# Patient Record
Sex: FEMALE | Race: WHITE | NOT HISPANIC OR LATINO | Employment: FULL TIME | URBAN - METROPOLITAN AREA
[De-identification: names, ages, dates, MRNs, and addresses within clinical notes are randomized per-mention and may not be internally consistent; named-entity substitution may affect disease eponyms.]

---

## 2017-01-30 DIAGNOSIS — J30.9 ALLERGIC RHINITIS: ICD-10-CM

## 2017-01-30 RX ORDER — MONTELUKAST SODIUM 10 MG/1
TABLET ORAL
Qty: 30 TABLET | Refills: 0 | Status: SHIPPED | OUTPATIENT
Start: 2017-01-30 | End: 2017-02-27 | Stop reason: SDUPTHER

## 2017-02-27 DIAGNOSIS — J30.9 ALLERGIC RHINITIS: ICD-10-CM

## 2017-02-27 RX ORDER — MONTELUKAST SODIUM 10 MG/1
TABLET ORAL
Qty: 30 TABLET | Refills: 1 | Status: SHIPPED | OUTPATIENT
Start: 2017-02-27 | End: 2017-04-28 | Stop reason: SDUPTHER

## 2017-04-28 DIAGNOSIS — J30.9 ALLERGIC RHINITIS: ICD-10-CM

## 2017-04-28 RX ORDER — MONTELUKAST SODIUM 10 MG/1
TABLET ORAL
Qty: 30 TABLET | Refills: 0 | Status: SHIPPED | OUTPATIENT
Start: 2017-04-28 | End: 2017-05-26 | Stop reason: SDUPTHER

## 2017-04-29 DIAGNOSIS — J30.9 ALLERGIC RHINITIS: ICD-10-CM

## 2017-05-01 RX ORDER — MONTELUKAST SODIUM 10 MG/1
TABLET ORAL
Qty: 30 TABLET | Refills: 0 | OUTPATIENT
Start: 2017-05-01

## 2017-05-01 RX ORDER — GUANFACINE 2 MG/1
TABLET ORAL
Qty: 60 TABLET | Refills: 0 | OUTPATIENT
Start: 2017-05-01

## 2017-05-26 DIAGNOSIS — J30.9 ALLERGIC RHINITIS: ICD-10-CM

## 2017-05-26 RX ORDER — MONTELUKAST SODIUM 10 MG/1
TABLET ORAL
Qty: 30 TABLET | Refills: 3 | Status: SHIPPED | OUTPATIENT
Start: 2017-05-26 | End: 2017-09-27 | Stop reason: SDUPTHER

## 2017-06-19 RX ORDER — GUANFACINE 2 MG/1
TABLET ORAL
Qty: 60 TABLET | Refills: 0 | Status: SHIPPED | OUTPATIENT
Start: 2017-06-19 | End: 2017-08-18 | Stop reason: SDUPTHER

## 2017-08-18 RX ORDER — GUANFACINE 2 MG/1
TABLET ORAL
Qty: 60 TABLET | Refills: 2 | Status: SHIPPED | OUTPATIENT
Start: 2017-08-18 | End: 2018-01-05 | Stop reason: SDUPTHER

## 2017-09-20 ENCOUNTER — OFFICE VISIT (OUTPATIENT)
Dept: INTERNAL MEDICINE | Facility: CLINIC | Age: 47
End: 2017-09-20

## 2017-09-20 VITALS
DIASTOLIC BLOOD PRESSURE: 78 MMHG | SYSTOLIC BLOOD PRESSURE: 110 MMHG | BODY MASS INDEX: 29 KG/M2 | TEMPERATURE: 97.8 F | RESPIRATION RATE: 17 BRPM | HEIGHT: 62 IN | WEIGHT: 157.6 LBS | OXYGEN SATURATION: 98 % | HEART RATE: 62 BPM

## 2017-09-20 DIAGNOSIS — M19.90 ARTHRITIS: ICD-10-CM

## 2017-09-20 DIAGNOSIS — Z76.89 SLEEP CONCERN: ICD-10-CM

## 2017-09-20 DIAGNOSIS — Z00.00 ANNUAL PHYSICAL EXAM: Primary | ICD-10-CM

## 2017-09-20 LAB
ALBUMIN SERPL-MCNC: 4.5 G/DL (ref 3.5–5.2)
ALBUMIN/GLOB SERPL: 1.6 G/DL
ALP SERPL-CCNC: 38 U/L (ref 39–117)
ALT SERPL W P-5'-P-CCNC: 13 U/L (ref 1–33)
ANION GAP SERPL CALCULATED.3IONS-SCNC: 12 MMOL/L
AST SERPL-CCNC: 16 U/L (ref 1–32)
BASOPHILS # BLD AUTO: 0.03 10*3/MM3 (ref 0–0.2)
BASOPHILS NFR BLD AUTO: 0.5 % (ref 0–2)
BILIRUB SERPL-MCNC: 0.8 MG/DL (ref 0.1–1.2)
BILIRUB UR QL STRIP: NEGATIVE
BUN BLD-MCNC: 8 MG/DL (ref 6–20)
BUN/CREAT SERPL: 9 (ref 7–25)
CALCIUM SPEC-SCNC: 9.9 MG/DL (ref 8.6–10.5)
CHLORIDE SERPL-SCNC: 100 MMOL/L (ref 98–107)
CHOLEST SERPL-MCNC: 191 MG/DL (ref 0–200)
CHROMATIN AB SERPL-ACNC: <10 IU/ML (ref 0–14)
CLARITY UR: CLEAR
CO2 SERPL-SCNC: 28 MMOL/L (ref 22–29)
COLOR UR: YELLOW
CREAT BLD-MCNC: 0.89 MG/DL (ref 0.57–1)
DEPRECATED RDW RBC AUTO: 40.7 FL (ref 37–54)
EOSINOPHIL # BLD AUTO: 0.07 10*3/MM3 (ref 0–0.7)
EOSINOPHIL NFR BLD AUTO: 1.3 % (ref 0–5)
ERYTHROCYTE [DISTWIDTH] IN BLOOD BY AUTOMATED COUNT: 11.8 % (ref 11.5–15)
ERYTHROCYTE [SEDIMENTATION RATE] IN BLOOD: 5 MM/HR (ref 0–20)
GFR SERPL CREATININE-BSD FRML MDRD: 69 ML/MIN/1.73
GLOBULIN UR ELPH-MCNC: 2.8 GM/DL
GLUCOSE BLD-MCNC: 108 MG/DL (ref 65–99)
GLUCOSE UR STRIP-MCNC: NEGATIVE MG/DL
HCT VFR BLD AUTO: 38.7 % (ref 34.1–44.9)
HDLC SERPL-MCNC: 68 MG/DL (ref 40–60)
HGB BLD-MCNC: 13.8 G/DL (ref 11.2–15.7)
HGB UR QL STRIP.AUTO: NEGATIVE
KETONES UR QL STRIP: NEGATIVE
LDLC SERPL CALC-MCNC: 109 MG/DL (ref 0–100)
LDLC/HDLC SERPL: 1.6 {RATIO}
LEUKOCYTE ESTERASE UR QL STRIP.AUTO: NEGATIVE
LYMPHOCYTES # BLD AUTO: 2.22 10*3/MM3 (ref 0.8–7)
LYMPHOCYTES NFR BLD AUTO: 39.9 % (ref 10–60)
MCH RBC QN AUTO: 34.7 PG (ref 26–34)
MCHC RBC AUTO-ENTMCNC: 35.7 G/DL (ref 31–37)
MCV RBC AUTO: 97.2 FL (ref 80–100)
MONOCYTES # BLD AUTO: 0.31 10*3/MM3 (ref 0–1)
MONOCYTES NFR BLD AUTO: 5.6 % (ref 0–13)
NEUTROPHILS # BLD AUTO: 2.93 10*3/MM3 (ref 1–11)
NEUTROPHILS NFR BLD AUTO: 52.7 % (ref 30–85)
NITRITE UR QL STRIP: NEGATIVE
PH UR STRIP.AUTO: 7 [PH] (ref 5–8)
PLATELET # BLD AUTO: 211 10*3/MM3 (ref 150–450)
PMV BLD AUTO: 11 FL (ref 6–12)
POTASSIUM BLD-SCNC: 4.2 MMOL/L (ref 3.5–5.2)
PROT SERPL-MCNC: 7.3 G/DL (ref 6–8.5)
PROT UR QL STRIP: NEGATIVE
RBC # BLD AUTO: 3.98 10*6/MM3 (ref 3.93–5.22)
SODIUM BLD-SCNC: 140 MMOL/L (ref 136–145)
SP GR UR STRIP: 1.01 (ref 1–1.03)
TRIGL SERPL-MCNC: 72 MG/DL (ref 0–150)
UROBILINOGEN UR QL STRIP: NORMAL
VLDLC SERPL-MCNC: 14.4 MG/DL (ref 5–40)
WBC NRBC COR # BLD: 5.56 10*3/MM3 (ref 5–10)

## 2017-09-20 PROCEDURE — 99396 PREV VISIT EST AGE 40-64: CPT | Performed by: INTERNAL MEDICINE

## 2017-09-20 PROCEDURE — 86431 RHEUMATOID FACTOR QUANT: CPT | Performed by: INTERNAL MEDICINE

## 2017-09-20 PROCEDURE — 85025 COMPLETE CBC W/AUTO DIFF WBC: CPT | Performed by: INTERNAL MEDICINE

## 2017-09-20 PROCEDURE — 80061 LIPID PANEL: CPT | Performed by: INTERNAL MEDICINE

## 2017-09-20 PROCEDURE — 80053 COMPREHEN METABOLIC PANEL: CPT | Performed by: INTERNAL MEDICINE

## 2017-09-20 PROCEDURE — 81003 URINALYSIS AUTO W/O SCOPE: CPT | Performed by: INTERNAL MEDICINE

## 2017-09-20 PROCEDURE — 85652 RBC SED RATE AUTOMATED: CPT | Performed by: INTERNAL MEDICINE

## 2017-09-20 PROCEDURE — 36415 COLL VENOUS BLD VENIPUNCTURE: CPT | Performed by: INTERNAL MEDICINE

## 2017-09-20 RX ORDER — PAROXETINE HYDROCHLORIDE 20 MG/1
1 TABLET, FILM COATED ORAL DAILY
COMMUNITY
Start: 2017-08-24 | End: 2017-11-21 | Stop reason: ALTCHOICE

## 2017-09-20 RX ORDER — ESTRADIOL 0.1 MG/G
2 CREAM VAGINAL DAILY
COMMUNITY
End: 2022-04-26

## 2017-09-20 NOTE — PROGRESS NOTES
Chief Complaint   Patient presents with   • Annual Exam        Subjective   Viktoria Mukherjee is a 46 y.o. female     HPI: She is here for her annual examination.  She has ongoing problems with arthritis, hypothyroidism, allergies, tremor, numbness in hands and feet and fatigue.    Arthritis affects her hips, toes, hands, feet and knees.  It doesn't happen all the time.  She notices it after she's been seated for a while.  Also with weather changes.  Movement and ibuprofen help.  She takes ibuprofen as needed.  200-400 mg a time, once or twice a week.  When she was in college, she was evaluated for rheumatoid arthritis.  She is wondering if that's going on.    She sees an endocrinologist for follow-up of hypothyroidism.  She currently takes levothyroxine 88 µg 1 tablet 6 days a week and takes one and a half tablets on Sundays. Allergies are controlled with montelukast and an inhaler. Tremor is controlled with guanfacine. She notices numbness in her hands during the night.  Notices numbness of her feet while exercising. She has upper and lower back ache at times.     She has had follow up with psychiatry recently for anxiety and depression. She has started taking paroxetine.  It helps, but she has gained weight.    She sees her gyn for pap and mammograms.  She is up to date on these.  She has regular dental and eye exams.  She had a tetanus booster in 4/2011.    She has problems with fatigue. Does not always feel rested when she gets up in the mornings.  Can fall asleep during the day.    The following portions of the patient's history were reviewed and updated as appropriate: allergies, current medications, past social history, problem list, past surgical history    Review of Systems   Constitutional: Positive for fatigue. Negative for appetite change, chills and fever.   HENT: Negative for congestion, ear pain, sinus pressure, sneezing, sore throat, tinnitus, trouble swallowing and voice change.    Eyes: Negative for  "visual disturbance.        Glasses   Respiratory: Negative for cough and shortness of breath.    Cardiovascular: Negative for chest pain, palpitations and leg swelling.   Gastrointestinal: Positive for constipation. Negative for abdominal pain, diarrhea, nausea and vomiting.   Endocrine: Negative for cold intolerance, heat intolerance, polydipsia and polyuria.   Genitourinary: Negative for dysuria and frequency.        Stress incontinence   Musculoskeletal: Positive for arthralgias, back pain and myalgias. Negative for gait problem.   Skin: Negative for rash.   Neurological: Positive for tremors and numbness. Negative for dizziness, syncope and headaches.   Hematological: Negative for adenopathy. Does not bruise/bleed easily.   Psychiatric/Behavioral: Negative for decreased concentration, dysphoric mood and sleep disturbance. The patient is not nervous/anxious.            Objective     /78 (BP Location: Left arm, Patient Position: Sitting, Cuff Size: Adult)  Pulse 62  Temp 97.8 °F (36.6 °C) (Temporal Artery )   Resp 17  Ht 62.25\" (158.1 cm)  Wt 157 lb 9.6 oz (71.5 kg)  SpO2 98%  Breastfeeding? No  BMI 28.59 kg/m2     Physical Exam   Constitutional: She is oriented to person, place, and time. She appears well-developed and well-nourished. No distress.   HENT:   Right Ear: Hearing, tympanic membrane, external ear and ear canal normal.   Left Ear: Hearing, tympanic membrane, external ear and ear canal normal.   Nose: Right sinus exhibits no maxillary sinus tenderness and no frontal sinus tenderness. Left sinus exhibits no maxillary sinus tenderness and no frontal sinus tenderness.   Eyes: Conjunctivae, EOM and lids are normal. Pupils are equal, round, and reactive to light.   Neck: Trachea normal and phonation normal. Neck supple. Normal carotid pulses and no JVD present. Carotid bruit is not present. No thyroid mass and no thyromegaly present.   Cardiovascular: Normal rate, regular rhythm, S1 normal and " S2 normal.  PMI is not displaced.  Exam reveals no gallop and no friction rub.    No murmur heard.  Pulses:       Carotid pulses are 2+ on the right side, and 2+ on the left side.       Radial pulses are 2+ on the right side, and 2+ on the left side.        Dorsalis pedis pulses are 2+ on the right side, and 2+ on the left side.   Pulmonary/Chest: Effort normal and breath sounds normal. She has no wheezes. She has no rhonchi. She has no rales. Chest wall is not dull to percussion.   Abdominal: Soft. Normal appearance, normal aorta and bowel sounds are normal. She exhibits no abdominal bruit and no mass. There is no hepatosplenomegaly. There is no tenderness.   Musculoskeletal: Normal range of motion. She exhibits no edema, tenderness or deformity.   Lymphadenopathy:     She has no cervical adenopathy.     She has no axillary adenopathy.        Left: No supraclavicular adenopathy present.   Neurological: She is alert and oriented to person, place, and time. She has normal strength and normal reflexes. No cranial nerve deficit or sensory deficit. Coordination normal.   Skin: Skin is warm and dry. No rash noted.   Psychiatric: She has a normal mood and affect. Her speech is normal and behavior is normal. Judgment and thought content normal. Cognition and memory are normal. She is attentive.   Nursing note and vitals reviewed.      Assessment/Plan   Problem List Items Addressed This Visit     None      Visit Diagnoses     Annual physical exam    -  Primary    Relevant Orders    Comprehensive Metabolic Panel    Urinalysis With / Microscopic If Indicated (Completed)    Lipid Panel    CBC & Differential (Completed)    CBC Auto Differential (Completed)    Sleep concern        Relevant Orders    Ambulatory Referral to Sleep Medicine    Arthritis        Relevant Orders    Rheumatoid Factor, Quant    Sedimentation Rate      Advised OK to take advil 400 mgm tid.  She will continue prudent diet, regular exercise.

## 2017-09-27 DIAGNOSIS — J30.9 ALLERGIC RHINITIS: ICD-10-CM

## 2017-09-27 RX ORDER — MONTELUKAST SODIUM 10 MG/1
TABLET ORAL
Qty: 30 TABLET | Refills: 2 | Status: SHIPPED | OUTPATIENT
Start: 2017-09-27 | End: 2017-12-29 | Stop reason: SDUPTHER

## 2017-11-21 ENCOUNTER — OFFICE VISIT (OUTPATIENT)
Dept: SLEEP MEDICINE | Facility: HOSPITAL | Age: 47
End: 2017-11-21
Attending: INTERNAL MEDICINE

## 2017-11-21 VITALS
HEIGHT: 63 IN | SYSTOLIC BLOOD PRESSURE: 125 MMHG | DIASTOLIC BLOOD PRESSURE: 76 MMHG | BODY MASS INDEX: 28.35 KG/M2 | WEIGHT: 160 LBS | HEART RATE: 68 BPM

## 2017-11-21 DIAGNOSIS — R53.82 CHRONIC FATIGUE: ICD-10-CM

## 2017-11-21 DIAGNOSIS — F45.8 BRUXISM: ICD-10-CM

## 2017-11-21 DIAGNOSIS — Z76.89 SLEEP CONCERN: ICD-10-CM

## 2017-11-21 DIAGNOSIS — G47.10 HYPERSOMNIA: Primary | ICD-10-CM

## 2017-11-21 DIAGNOSIS — G47.33 OSA (OBSTRUCTIVE SLEEP APNEA): ICD-10-CM

## 2017-11-21 DIAGNOSIS — G47.419 PRIMARY NARCOLEPSY WITHOUT CATAPLEXY: ICD-10-CM

## 2017-11-21 PROCEDURE — G0463 HOSPITAL OUTPT CLINIC VISIT: HCPCS

## 2017-11-21 RX ORDER — DESVENLAFAXINE SUCCINATE 50 MG/1
50 TABLET, EXTENDED RELEASE ORAL DAILY
COMMUNITY
Start: 2017-11-03 | End: 2021-10-15 | Stop reason: DRUGHIGH
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
## 2017-12-29 DIAGNOSIS — J30.9 ALLERGIC RHINITIS: ICD-10-CM

## 2017-12-29 RX ORDER — MONTELUKAST SODIUM 10 MG/1
TABLET ORAL
Qty: 30 TABLET | Refills: 3 | Status: SHIPPED | OUTPATIENT
Start: 2017-12-29 | End: 2018-04-30 | Stop reason: SDUPTHER

## 2018-01-05 RX ORDER — GUANFACINE 2 MG/1
TABLET ORAL
Qty: 60 TABLET | Refills: 1 | Status: SHIPPED | OUTPATIENT
Start: 2018-01-05 | End: 2018-06-13 | Stop reason: SDUPTHER

## 2018-02-06 ENCOUNTER — APPOINTMENT (OUTPATIENT)
Dept: WOMENS IMAGING | Facility: HOSPITAL | Age: 48
End: 2018-02-06

## 2018-02-06 PROCEDURE — 77067 SCR MAMMO BI INCL CAD: CPT | Performed by: RADIOLOGY

## 2018-02-06 PROCEDURE — 77063 BREAST TOMOSYNTHESIS BI: CPT | Performed by: RADIOLOGY

## 2018-02-15 ENCOUNTER — HOSPITAL ENCOUNTER (OUTPATIENT)
Dept: SLEEP MEDICINE | Facility: HOSPITAL | Age: 48
Discharge: HOME | End: 2018-02-15
Attending: PSYCHIATRY & NEUROLOGY | Admitting: PSYCHIATRY & NEUROLOGY

## 2018-02-15 DIAGNOSIS — G47.10 HYPERSOMNIA: ICD-10-CM

## 2018-02-15 DIAGNOSIS — R53.82 CHRONIC FATIGUE: ICD-10-CM

## 2018-02-15 DIAGNOSIS — G47.33 OSA (OBSTRUCTIVE SLEEP APNEA): ICD-10-CM

## 2018-02-15 DIAGNOSIS — F45.8 BRUXISM: ICD-10-CM

## 2018-02-15 PROCEDURE — 95810 POLYSOM 6/> YRS 4/> PARAM: CPT

## 2018-02-16 ENCOUNTER — HOSPITAL ENCOUNTER (OUTPATIENT)
Dept: SLEEP MEDICINE | Facility: HOSPITAL | Age: 48
Discharge: HOME | End: 2018-02-16
Attending: PSYCHIATRY & NEUROLOGY | Admitting: PSYCHIATRY & NEUROLOGY

## 2018-02-16 DIAGNOSIS — R53.82 CHRONIC FATIGUE: ICD-10-CM

## 2018-02-16 DIAGNOSIS — G47.419 PRIMARY NARCOLEPSY WITHOUT CATAPLEXY: ICD-10-CM

## 2018-02-16 DIAGNOSIS — G47.10 HYPERSOMNIA: ICD-10-CM

## 2018-02-16 LAB
AMPHET+METHAMPHET UR QL: NEGATIVE
BARBITURATES UR QL SCN: NEGATIVE
BENZODIAZ UR QL SCN: NEGATIVE
CANNABINOIDS SERPL QL: NEGATIVE
COCAINE UR QL: NEGATIVE
METHADONE UR QL SCN: NEGATIVE
OPIATES UR QL: NEGATIVE
OXYCODONE UR QL SCN: NEGATIVE

## 2018-02-16 PROCEDURE — 80307 DRUG TEST PRSMV CHEM ANLYZR: CPT | Performed by: PSYCHIATRY & NEUROLOGY

## 2018-02-16 PROCEDURE — 95805 MULTIPLE SLEEP LATENCY TEST: CPT

## 2018-03-19 ENCOUNTER — OFFICE VISIT (OUTPATIENT)
Dept: INTERNAL MEDICINE | Facility: CLINIC | Age: 48
End: 2018-03-19

## 2018-03-19 VITALS
HEIGHT: 63 IN | WEIGHT: 153 LBS | SYSTOLIC BLOOD PRESSURE: 108 MMHG | DIASTOLIC BLOOD PRESSURE: 70 MMHG | BODY MASS INDEX: 27.11 KG/M2

## 2018-03-19 DIAGNOSIS — Z76.89 SLEEP CONCERN: Primary | ICD-10-CM

## 2018-03-19 PROCEDURE — 99212 OFFICE O/P EST SF 10 MIN: CPT | Performed by: INTERNAL MEDICINE

## 2018-03-19 RX ORDER — CHLORAL HYDRATE 500 MG
CAPSULE ORAL
COMMUNITY

## 2018-03-19 NOTE — PROGRESS NOTES
Chief Complaint   Patient presents with   • Follow-up     6 month follow up   • Hypothyroidism       Subjective   Viktoria Mukherjee is a 47 y.o. female.     She comes in to follow-up on her evaluation of sleep concern.  She continues on her medications as listed.  She has had consultation with the sleep Center, she has had a sleep study.  She does not have obstructive sleep apnea.  She has changed her diet.  She's eating less sugar.  She's having less fatigue.  She continues to see her endocrinologist for hypothyroidism, sees her neurologist for the tic disorder.         The following portions of the patient's history were reviewed and updated as appropriate: allergies, current medications, past family history, past medical history, past social history, past surgical history and problem list.      Current Outpatient Prescriptions:   •  cholecalciferol (VITAMIN D3) 1000 UNITS tablet, Take 1,000 Units by mouth daily., Disp: , Rfl:   •  desvenlafaxine (PRISTIQ) 50 MG 24 hr tablet, Take 50 mg by mouth Daily., Disp: , Rfl:   •  DULERA 100-5 MCG/ACT inhaler, INHALE TWO PUFFS BY MOUTH TWICE A DAY, Disp: 13 g, Rfl: 2  •  estradiol (ESTRACE) 0.1 MG/GM vaginal cream, Insert 2 g into the vagina Daily., Disp: , Rfl:   •  guanFACINE (TENEX) 2 MG tablet, TAKE ONE TABLET BY MOUTH TWICE A DAY, Disp: 60 tablet, Rfl: 1  •  levothyroxine (SYNTHROID, LEVOTHROID) 88 MCG tablet, Take 88 mcg by mouth Daily., Disp: , Rfl:   •  magnesium oxide (MAGOX) 400 (241.3 Mg) MG tablet tablet, Take 400 mg by mouth Daily., Disp: , Rfl:   •  montelukast (SINGULAIR) 10 MG tablet, TAKE ONE TABLET BY MOUTH DAILY, Disp: 30 tablet, Rfl: 3  •  Multiple Vitamin (MULTI VITAMIN DAILY) tablet, Take  by mouth., Disp: , Rfl:   •  Omega-3 Fatty Acids (FISH OIL) 1000 MG capsule capsule, Take  by mouth Daily With Breakfast., Disp: , Rfl:     Review of Systems   Constitutional: Negative for appetite change.   HENT: Negative for nosebleeds.    Eyes: Negative for blurred  "vision and double vision.   Respiratory: Negative for cough and shortness of breath.    Cardiovascular: Negative for chest pain, palpitations and leg swelling.   Neurological: Negative for dizziness and headaches.       Objective     Vitals:    03/19/18 0816   BP: 108/70   Weight: 69.4 kg (153 lb)   Height: 160 cm (63\")       Physical Exam   Constitutional: She is oriented to person, place, and time. She appears well-developed and well-nourished. No distress.   Neck: Normal carotid pulses present. Carotid bruit is not present.   Cardiovascular: Regular rhythm, S1 normal and S2 normal.  Exam reveals no gallop and no friction rub.    No murmur heard.  Pulses:       Carotid pulses are 2+ on the right side, and 2+ on the left side.  Pulmonary/Chest: Effort normal and breath sounds normal. She has no wheezes. She has no rhonchi. She has no rales. Chest wall is not dull to percussion.   Musculoskeletal: She exhibits no edema.   Neurological: She is alert and oriented to person, place, and time.   Skin: Skin is warm and dry.         Assessment/Plan   Viktoria was seen today for follow-up and hypothyroidism.    Diagnoses and all orders for this visit:    Sleep concern      She will follow-up with the sleep Center to discuss options for treatment of daytime somnolence that she has had..  It sounds like it is better with her change in diet.  Return in 6 months for CPE, fasting lab.         "

## 2018-03-20 ENCOUNTER — OFFICE VISIT (OUTPATIENT)
Dept: SLEEP MEDICINE | Facility: HOSPITAL | Age: 48
End: 2018-03-20
Attending: PSYCHIATRY & NEUROLOGY

## 2018-03-20 VITALS
SYSTOLIC BLOOD PRESSURE: 113 MMHG | HEIGHT: 60 IN | WEIGHT: 153 LBS | BODY MASS INDEX: 30.04 KG/M2 | DIASTOLIC BLOOD PRESSURE: 71 MMHG | HEART RATE: 61 BPM

## 2018-03-20 DIAGNOSIS — G47.11 IDIOPATHIC HYPERSOMNIA WITH LONG SLEEP TIME: Primary | ICD-10-CM

## 2018-03-20 PROCEDURE — G0463 HOSPITAL OUTPT CLINIC VISIT: HCPCS

## 2018-03-20 RX ORDER — MODAFINIL 200 MG/1
200 TABLET ORAL DAILY
Qty: 30 TABLET | Refills: 5 | Status: SHIPPED | OUTPATIENT
Start: 2018-03-20 | End: 2018-09-18 | Stop reason: SDUPTHER
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
## 2018-03-20 NOTE — PATIENT INSTRUCTIONS
Modafinil tablets  What is this medicine?  MODAFINIL (aretha DAF i nil) is used to treat excessive sleepiness caused by certain sleep disorders. This includes narcolepsy, sleep apnea, and shift work sleep disorder.  This medicine may be used for other purposes; ask your health care provider or pharmacist if you have questions.  COMMON BRAND NAME(S): Provigil  What should I tell my health care provider before I take this medicine?  They need to know if you have any of these conditions:  -history of depression, veronica, or other mental disorder  -kidney disease  -liver disease  -an unusual or allergic reaction to modafinil, other medicines, foods, dyes, or preservatives  -pregnant or trying to get pregnant  -breast-feeding  How should I use this medicine?  Take this medicine by mouth with a glass of water. Follow the directions on the prescription label. Take your doses at regular intervals. Do not take your medicine more often than directed. Do not stop taking except on your doctor's advice.  A special MedGuide will be given to you by the pharmacist with each prescription and refill. Be sure to read this information carefully each time.  Talk to your pediatrician regarding the use of this medicine in children. This medicine is not approved for use in children.  Overdosage: If you think you have taken too much of this medicine contact a poison control center or emergency room at once.  NOTE: This medicine is only for you. Do not share this medicine with others.  What if I miss a dose?  If you miss a dose, take it as soon as you can. If it is almost time for your next dose, take only that dose. Do not take double or extra doses.  What may interact with this medicine?  Do not take this medicine with any of the following medications:  -amphetamine or dextroamphetamine  -dexmethylphenidate or methylphenidate  -medicines called MAO Inhibitors like Nardil, Parnate, Marplan, Eldepryl  -pemoline  -procarbazine  This medicine may  also interact with the following medications:  -antifungal medicines like itraconazole or ketoconazole  -barbiturates like phenobarbital  -birth control pills or other hormone-containing birth control devices or implants  -carbamazepine  -cyclosporine  -diazepam  -medicines for depression, anxiety, or psychotic disturbances  -phenytoin  -propranolol  -triazolam  -warfarin  This list may not describe all possible interactions. Give your health care provider a list of all the medicines, herbs, non-prescription drugs, or dietary supplements you use. Also tell them if you smoke, drink alcohol, or use illegal drugs. Some items may interact with your medicine.  What should I watch for while using this medicine?  Visit your doctor or health care professional for regular checks on your progress. The full effects of this medicine may not be seen right away.  This medicine may affect your concentration, function, or may hide signs that you are tired. You may get dizzy. Do not drive, use machinery, or do anything that needs mental alertness until you know how this drug affects you. Alcohol can make you more dizzy and may interfere with your response to this medicine or your alertness. Avoid alcoholic drinks.  Birth control pills may not work properly while you are taking this medicine. Talk to your doctor about using an extra method of birth control.  It is unknown if the effects of this medicine will be increased by the use of caffeine. Caffeine is available in many foods, beverages, and medications. Ask your doctor if you should limit or change your intake of caffeine-containing products while on this medicine.  What side effects may I notice from receiving this medicine?  Side effects that you should report to your doctor or health care professional as soon as possible:  -allergic reactions like skin rash, itching or hives, swelling of the face, lips, or tongue  -anxiety  -breathing problems  -chest pain  -fast, irregular  heartbeat  -hallucinations  -increased blood pressure  -redness, blistering, peeling or loosening of the skin, including inside the mouth  -sore throat, fever, or chills  -suicidal thoughts or other mood changes  -tremors  -vomiting  Side effects that usually do not require medical attention (report to your doctor or health care professional if they continue or are bothersome):  -headache  -nausea, diarrhea, or stomach upset  -nervousness  -trouble sleeping  This list may not describe all possible side effects. Call your doctor for medical advice about side effects. You may report side effects to FDA at 4-108-FDA-7773.  Where should I keep my medicine?  Keep out of the reach of children. This medicine can be abused. Keep your medicine in a safe place to protect it from theft. Do not share this medicine with anyone. Selling or giving away this medicine is dangerous and against the law.  This medicine may cause accidental overdose and death if taken by other adults, children, or pets. Mix any unused medicine with a substance like cat litter or coffee grounds. Then throw the medicine away in a sealed container like a sealed bag or a coffee can with a lid. Do not use the medicine after the expiration date.  Store at room temperature between 20 and 25 degrees C (68 and 77 degrees F).  NOTE: This sheet is a summary. It may not cover all possible information. If you have questions about this medicine, talk to your doctor, pharmacist, or health care provider.  © 2018 [employer]vier/Gold Standard (2015-09-08 15:34:55)

## 2018-04-30 DIAGNOSIS — J30.9 ALLERGIC RHINITIS: ICD-10-CM

## 2018-04-30 RX ORDER — MONTELUKAST SODIUM 10 MG/1
TABLET ORAL
Qty: 30 TABLET | Refills: 2 | Status: SHIPPED | OUTPATIENT
Start: 2018-04-30 | End: 2018-07-31 | Stop reason: SDUPTHER

## 2018-06-14 RX ORDER — GUANFACINE 2 MG/1
TABLET ORAL
Qty: 60 TABLET | Refills: 2 | Status: SHIPPED | OUTPATIENT
Start: 2018-06-14 | End: 2018-12-18 | Stop reason: SDUPTHER

## 2018-07-31 DIAGNOSIS — J30.9 ALLERGIC RHINITIS: ICD-10-CM

## 2018-07-31 RX ORDER — MONTELUKAST SODIUM 10 MG/1
TABLET ORAL
Qty: 30 TABLET | Refills: 4 | Status: SHIPPED | OUTPATIENT
Start: 2018-07-31 | End: 2018-12-18 | Stop reason: SDUPTHER

## 2018-09-18 ENCOUNTER — OFFICE VISIT (OUTPATIENT)
Dept: SLEEP MEDICINE | Facility: HOSPITAL | Age: 48
End: 2018-09-18
Attending: PSYCHIATRY & NEUROLOGY

## 2018-09-18 VITALS
HEART RATE: 70 BPM | HEIGHT: 61 IN | BODY MASS INDEX: 27.94 KG/M2 | OXYGEN SATURATION: 99 % | WEIGHT: 148 LBS | DIASTOLIC BLOOD PRESSURE: 60 MMHG | SYSTOLIC BLOOD PRESSURE: 122 MMHG

## 2018-09-18 DIAGNOSIS — G47.11 IDIOPATHIC HYPERSOMNIA WITH LONG SLEEP TIME: ICD-10-CM

## 2018-09-18 PROCEDURE — G0463 HOSPITAL OUTPT CLINIC VISIT: HCPCS

## 2018-09-18 RX ORDER — MODAFINIL 100 MG/1
100 TABLET ORAL 2 TIMES DAILY
Qty: 60 TABLET | Refills: 5 | Status: SHIPPED | OUTPATIENT
Start: 2018-09-18 | End: 2019-04-23 | Stop reason: SDUPTHER

## 2018-09-18 NOTE — PROGRESS NOTES
"  Sleep Medicine follow up  Viktoria Mukherjee  : 1970  47 y.o. female   Date of Service: 2018  Referring provider: Rell Bowden MD    History Of Present Illness:   Mrs. Viktoria Mukherjee  is a 47 y.o. patient with history of asthma and hypothyroidism and did tic disorder and has Mild tremors of the jaw and neck and was told that she has tics and tremors at [facility] movement disorder clinic.  The patient reports that they have placed on guaifenesin which benefits her jaw tremors and tics.     Patient is here for a follow-up visit regarding Excessive Daytime Sleepiness and Chronic Fatigue.    The patient overnight polysomnography was normal with no sleep apnea or periodic limb movement disorder and angina she slept for 394 minutes and subsequent multiple sleep latency testing revealing mean sleep latency of 2.8 minutes but no sleep onset REM.    She feels energetic and functional and 200 mg of Provigil caused her to have palpitations and hence she has cut down the dose to / th tablet twice a day (50 mg twice a day).    Sleep schedule: Bedtime:9-11 p.m, gets out of bed at 6:30 AM, sleep latency: 30 minutes, Gets about 7-9 hours of sleep.  On weekends she may take naps in the afternoon.    [place] Sleepiness Scale score: 6/24 today.    Review of Systems   HENT: Positive for postnasal drip.    Musculoskeletal: Positive for arthralgias and myalgias.   Psychiatric/Behavioral: Positive for dysphoric mood.   All other systems reviewed and are negative.    Patient examination:  Vitals:    18 0823   BP: 122/60   BP Location: Right arm   Patient Position: Sitting   Cuff Size: Adult   Pulse: 70   SpO2: 99%   Weight: 67.1 kg (148 lb)   Height: 154.9 cm (61\")   BMI 28.34  HEENT: Normal and Mallampati Class II: Soft palate, fauces, uvula visible   Neck: Supple no carotid bruits.  Lungs: Clear to auscultation.  Cardiac exam: Normal and regular rate and rhythm. No murmurs.  Extremities: No edema " clubbing or cyanosis.    Date 11/21/2017 3/120/2018 9/18/18        [place] 15 12 6           MRI Brain in the past was normal.   2/16/2018: NPSG followed by MSLT: Overnight polysomnography performed the night prior to the MSLT study revealed no evidence for obstructive sleep apnea syndrome or PLMD.  The patient slept for 394 minutes with sleep onset latency of 9.6 minutes and latency to REM sleep from sleep onset 272 minutes.  Urine drug screen was negative.     The MSLT study revealed a mean sleep latency of 2.8 minutes and 0 Sleep Onset REM (SOREM’s) were seen.     ASSESSMENT AND PLAN:The patient has Idiopathic hypersomnia.  The patient is greatly benefiting from Provigil. We will treat the patient with Provigil 100 mg twice a day as a single dose of 200 mg dose causes her to have palpitations.    Sleep hygiene techniques discussed.  Exercise diet and weight loss discussed.      Encounter Diagnosis   Name Primary?   • Idiopathic hypersomnia with long sleep time      Return in about 6 months (around 3/18/2019).    Rell Bowden MD  9/18/2018  8:48 AM

## 2018-09-27 ENCOUNTER — TELEPHONE (OUTPATIENT)
Dept: INTERNAL MEDICINE | Facility: CLINIC | Age: 48
End: 2018-09-27

## 2018-09-27 ENCOUNTER — OFFICE VISIT (OUTPATIENT)
Dept: INTERNAL MEDICINE | Facility: CLINIC | Age: 48
End: 2018-09-27

## 2018-09-27 VITALS
SYSTOLIC BLOOD PRESSURE: 100 MMHG | WEIGHT: 145 LBS | HEIGHT: 61 IN | HEART RATE: 61 BPM | BODY MASS INDEX: 27.38 KG/M2 | OXYGEN SATURATION: 98 % | DIASTOLIC BLOOD PRESSURE: 70 MMHG

## 2018-09-27 DIAGNOSIS — Z00.00 ANNUAL PHYSICAL EXAM: Primary | ICD-10-CM

## 2018-09-27 LAB
25(OH)D3 SERPL-MCNC: 106.9 NG/ML (ref 30–100)
ALBUMIN SERPL-MCNC: 4.5 G/DL (ref 3.5–5.2)
ALBUMIN/GLOB SERPL: 1.8 G/DL
ALP SERPL-CCNC: 40 U/L (ref 39–117)
ALT SERPL W P-5'-P-CCNC: 15 U/L (ref 1–33)
ANION GAP SERPL CALCULATED.3IONS-SCNC: 8.2 MMOL/L
AST SERPL-CCNC: 17 U/L (ref 1–32)
BACTERIA UR QL AUTO: ABNORMAL /HPF
BASOPHILS # BLD AUTO: 0.03 10*3/MM3 (ref 0–0.2)
BASOPHILS NFR BLD AUTO: 0.6 % (ref 0–2)
BILIRUB SERPL-MCNC: 0.7 MG/DL (ref 0.1–1.2)
BILIRUB UR QL STRIP: NEGATIVE
BUN BLD-MCNC: 11 MG/DL (ref 6–20)
BUN/CREAT SERPL: 10.6 (ref 7–25)
CALCIUM SPEC-SCNC: 9.8 MG/DL (ref 8.6–10.5)
CHLORIDE SERPL-SCNC: 102 MMOL/L (ref 98–107)
CHOLEST SERPL-MCNC: 202 MG/DL (ref 0–200)
CLARITY UR: CLEAR
CO2 SERPL-SCNC: 29.8 MMOL/L (ref 22–29)
COLOR UR: YELLOW
CREAT BLD-MCNC: 1.04 MG/DL (ref 0.57–1)
DEPRECATED RDW RBC AUTO: 40.3 FL (ref 37–54)
EOSINOPHIL # BLD AUTO: 0.09 10*3/MM3 (ref 0–0.7)
EOSINOPHIL NFR BLD AUTO: 1.8 % (ref 0–5)
ERYTHROCYTE [DISTWIDTH] IN BLOOD BY AUTOMATED COUNT: 11.6 % (ref 11.5–15)
GFR SERPL CREATININE-BSD FRML MDRD: 57 ML/MIN/1.73
GLOBULIN UR ELPH-MCNC: 2.5 GM/DL
GLUCOSE BLD-MCNC: 122 MG/DL (ref 65–99)
GLUCOSE UR STRIP-MCNC: NEGATIVE MG/DL
HCT VFR BLD AUTO: 40.1 % (ref 34.1–44.9)
HDLC SERPL-MCNC: 62 MG/DL (ref 40–60)
HGB BLD-MCNC: 14.3 G/DL (ref 11.2–15.7)
HGB UR QL STRIP.AUTO: ABNORMAL
HYALINE CASTS UR QL AUTO: ABNORMAL /LPF
KETONES UR QL STRIP: NEGATIVE
LDLC SERPL CALC-MCNC: 131 MG/DL (ref 0–100)
LDLC/HDLC SERPL: 2.11 {RATIO}
LEUKOCYTE ESTERASE UR QL STRIP.AUTO: NEGATIVE
LYMPHOCYTES # BLD AUTO: 1.92 10*3/MM3 (ref 0.8–7)
LYMPHOCYTES NFR BLD AUTO: 39.2 % (ref 10–60)
MCH RBC QN AUTO: 34.7 PG (ref 26–34)
MCHC RBC AUTO-ENTMCNC: 35.7 G/DL (ref 31–37)
MCV RBC AUTO: 97.3 FL (ref 80–100)
MONOCYTES # BLD AUTO: 0.37 10*3/MM3 (ref 0–1)
MONOCYTES NFR BLD AUTO: 7.6 % (ref 0–13)
NEUTROPHILS # BLD AUTO: 2.49 10*3/MM3 (ref 1–11)
NEUTROPHILS NFR BLD AUTO: 50.8 % (ref 30–85)
NITRITE UR QL STRIP: NEGATIVE
PH UR STRIP.AUTO: 7.5 [PH] (ref 5–8)
PLATELET # BLD AUTO: 220 10*3/MM3 (ref 150–450)
PMV BLD AUTO: 11.4 FL (ref 6–12)
POTASSIUM BLD-SCNC: 4.2 MMOL/L (ref 3.5–5.2)
PROT SERPL-MCNC: 7 G/DL (ref 6–8.5)
PROT UR QL STRIP: NEGATIVE
RBC # BLD AUTO: 4.12 10*6/MM3 (ref 3.93–5.22)
RBC # UR: ABNORMAL /HPF
REF LAB TEST METHOD: ABNORMAL
SODIUM BLD-SCNC: 140 MMOL/L (ref 136–145)
SP GR UR STRIP: 1.01 (ref 1–1.03)
SQUAMOUS #/AREA URNS HPF: ABNORMAL /HPF
TRIGL SERPL-MCNC: 45 MG/DL (ref 0–150)
TSH SERPL DL<=0.05 MIU/L-ACNC: 3.84 MIU/ML (ref 0.27–4.2)
UROBILINOGEN UR QL STRIP: ABNORMAL
VLDLC SERPL-MCNC: 9 MG/DL (ref 5–40)
WBC NRBC COR # BLD: 4.9 10*3/MM3 (ref 5–10)
WBC UR QL AUTO: ABNORMAL /HPF

## 2018-09-27 PROCEDURE — 80061 LIPID PANEL: CPT | Performed by: INTERNAL MEDICINE

## 2018-09-27 PROCEDURE — 99396 PREV VISIT EST AGE 40-64: CPT | Performed by: INTERNAL MEDICINE

## 2018-09-27 PROCEDURE — 82306 VITAMIN D 25 HYDROXY: CPT | Performed by: INTERNAL MEDICINE

## 2018-09-27 PROCEDURE — 84443 ASSAY THYROID STIM HORMONE: CPT | Performed by: INTERNAL MEDICINE

## 2018-09-27 PROCEDURE — 93000 ELECTROCARDIOGRAM COMPLETE: CPT | Performed by: INTERNAL MEDICINE

## 2018-09-27 PROCEDURE — 81001 URINALYSIS AUTO W/SCOPE: CPT | Performed by: INTERNAL MEDICINE

## 2018-09-27 PROCEDURE — 85025 COMPLETE CBC W/AUTO DIFF WBC: CPT | Performed by: INTERNAL MEDICINE

## 2018-09-27 PROCEDURE — 36415 COLL VENOUS BLD VENIPUNCTURE: CPT | Performed by: INTERNAL MEDICINE

## 2018-09-27 PROCEDURE — 80053 COMPREHEN METABOLIC PANEL: CPT | Performed by: INTERNAL MEDICINE

## 2018-09-27 NOTE — TELEPHONE ENCOUNTER
----- Message from Audrey Tyler sent at 9/27/2018  2:08 PM EDT -----  Contact: [facility]  [facility] called with out-of-range vitamin D - 106.9.

## 2018-09-27 NOTE — PROGRESS NOTES
Chief Complaint   Patient presents with   • Annual Exam     fasting       Subjective   Viktoria Mukherjee is a 47 y.o. female.     History of Present Illness     She is here for annual examination.  She feels generally healthy.  Her appetite is good.  Diet is generally prudent.  She sleeps well, energy is good.  She exercises regularly. She is up to date on dental and eye exams. She sees gyn for pap, mammograms.  She is up to date on these. She will get her flu shot at work.  She requests postponing Tdap to 2021.       The following portions of the patient's history were reviewed and updated as appropriate: allergies, current medications, past family history, past medical history, past social history, past surgical history and problem list.    Review of Systems   Constitutional: Negative for appetite change, chills, fatigue, fever, unexpected weight gain and unexpected weight loss.   HENT: Negative for congestion, nosebleeds, sinus pressure, sore throat and trouble swallowing.    Eyes: Negative for blurred vision and double vision.   Respiratory: Positive for cough (occasionally with asthma). Negative for shortness of breath and wheezing.    Cardiovascular: Positive for palpitations (occasional PVCs). Negative for chest pain and leg swelling.   Gastrointestinal: Positive for constipation. Negative for abdominal pain, diarrhea, nausea, vomiting and indigestion.   Endocrine: Negative for cold intolerance, heat intolerance, polydipsia and polyuria.   Genitourinary: Negative for dysuria and frequency.   Musculoskeletal: Positive for arthralgias (arthritiis - finger joints, left hip, neck) and neck pain. Negative for back pain.   Skin: Negative for rash.   Neurological: Negative for dizziness and headache.   Hematological: Negative for adenopathy. Does not bruise/bleed easily.   Psychiatric/Behavioral: Negative for sleep disturbance and depressed mood. The patient is not nervous/anxious.          Current Outpatient  "Prescriptions:   •  cholecalciferol (VITAMIN D3) 1000 UNITS tablet, Take 1,000 Units by mouth daily., Disp: , Rfl:   •  desvenlafaxine (PRISTIQ) 50 MG 24 hr tablet, Take 50 mg by mouth Daily., Disp: , Rfl:   •  DULERA 100-5 MCG/ACT inhaler, INHALE TWO PUFFS BY MOUTH TWICE A DAY, Disp: 13 g, Rfl: 2  •  estradiol (ESTRACE) 0.1 MG/GM vaginal cream, Insert 2 g into the vagina Daily., Disp: , Rfl:   •  guanFACINE (TENEX) 2 MG tablet, TAKE ONE TABLET BY MOUTH TWICE A DAY, Disp: 60 tablet, Rfl: 2  •  levothyroxine (SYNTHROID, LEVOTHROID) 88 MCG tablet, Take 88 mcg by mouth Daily., Disp: , Rfl:   •  magnesium oxide (MAGOX) 400 (241.3 Mg) MG tablet tablet, Take 400 mg by mouth Daily., Disp: , Rfl:   •  modafinil (PROVIGIL) 100 MG tablet, Take 1 tablet by mouth 2 (Two) Times a Day., Disp: 60 tablet, Rfl: 5  •  montelukast (SINGULAIR) 10 MG tablet, TAKE ONE TABLET BY MOUTH DAILY, Disp: 30 tablet, Rfl: 4  •  Multiple Vitamin (MULTI VITAMIN DAILY) tablet, Take  by mouth., Disp: , Rfl:   •  Omega-3 Fatty Acids (FISH OIL) 1000 MG capsule capsule, Take  by mouth Daily With Breakfast., Disp: , Rfl:         Objective     /70 (BP Location: Left arm, Patient Position: Sitting)   Pulse 61   Ht 154.9 cm (60.98\")   Wt 65.8 kg (145 lb)   SpO2 98%   BMI 27.41 kg/m²      Physical Exam   Constitutional: She is oriented to person, place, and time. She appears well-developed and well-nourished. No distress.   HENT:   Right Ear: Tympanic membrane and ear canal normal.   Left Ear: Tympanic membrane and ear canal normal.   Nose: Right sinus exhibits no maxillary sinus tenderness and no frontal sinus tenderness. Left sinus exhibits no maxillary sinus tenderness and no frontal sinus tenderness.   Mouth/Throat: Oropharynx is clear and moist.   Eyes: Pupils are equal, round, and reactive to light. Conjunctivae and EOM are normal. No scleral icterus.   Neck: Normal range of motion. Neck supple. Normal carotid pulses present. Carotid bruit is " not present. No tracheal deviation present. No thyromegaly present.   Cardiovascular: Regular rhythm, S1 normal, S2 normal and intact distal pulses.  Exam reveals no gallop and no friction rub.    No murmur heard.  Pulses:       Carotid pulses are 2+ on the right side, and 2+ on the left side.  Pulmonary/Chest: Effort normal and breath sounds normal. She has no wheezes. She has no rhonchi. She has no rales. Chest wall is not dull to percussion. Right breast exhibits no inverted nipple, no mass, no nipple discharge, no skin change and no tenderness. Left breast exhibits no inverted nipple, no mass, no nipple discharge, no skin change and no tenderness.   Abdominal: Soft. Normal appearance and bowel sounds are normal. She exhibits no abdominal bruit. There is no hepatosplenomegaly. There is no tenderness. There is no rebound and no guarding.   Musculoskeletal: She exhibits no edema.   Lymphadenopathy:     She has no cervical adenopathy.   Neurological: She is alert and oriented to person, place, and time. No cranial nerve deficit. Coordination normal.   Skin: Skin is warm and dry.   Psychiatric: She has a normal mood and affect.   Nursing note and vitals reviewed.      ECG 12 Lead  Date/Time: 9/27/2018 8:27 AM  Performed by: MARY ANNE RIZZO  Authorized by: SONIA KNOWLES   Comparison: compared with previous ECG from 1/26/2016  Similar to previous ECG  Rhythm: sinus bradycardia  Rate: bradycardic  BPM: 53  Conduction: conduction normal  ST Segments: ST segments normal  T Waves: T waves normal  QRS axis: normal  Clinical impression: normal ECG            Assessment/Plan   Viktoria was seen today for annual exam.    Diagnoses and all orders for this visit:    Annual physical exam  -     ECG 12 Lead  -     Comprehensive Metabolic Panel; Future  -     CBC & Differential; Future  -     Urinalysis With Microscopic If Indicated (No Culture) - Urine, Clean Catch; Future  -     Lipid Panel; Future  -     TSH Rfx On Abnormal To Free  T4; Future  -     Vitamin D 25 Hydroxy; Future

## 2018-12-18 DIAGNOSIS — J30.9 ALLERGIC RHINITIS: ICD-10-CM

## 2018-12-18 RX ORDER — GUANFACINE 2 MG/1
TABLET ORAL
Qty: 60 TABLET | Refills: 1 | Status: SHIPPED | OUTPATIENT
Start: 2018-12-18 | End: 2019-04-24 | Stop reason: SDUPTHER

## 2018-12-18 RX ORDER — MONTELUKAST SODIUM 10 MG/1
TABLET ORAL
Qty: 30 TABLET | Refills: 3 | Status: SHIPPED | OUTPATIENT
Start: 2018-12-18 | End: 2019-04-27 | Stop reason: SDUPTHER

## 2019-03-19 ENCOUNTER — APPOINTMENT (OUTPATIENT)
Dept: SLEEP MEDICINE | Facility: HOSPITAL | Age: 49
End: 2019-03-19
Attending: PSYCHIATRY & NEUROLOGY

## 2019-03-25 ENCOUNTER — APPOINTMENT (OUTPATIENT)
Dept: WOMENS IMAGING | Facility: HOSPITAL | Age: 49
End: 2019-03-25

## 2019-03-25 ENCOUNTER — OFFICE VISIT (OUTPATIENT)
Dept: INTERNAL MEDICINE | Facility: CLINIC | Age: 49
End: 2019-03-25

## 2019-03-25 VITALS
WEIGHT: 143 LBS | OXYGEN SATURATION: 99 % | TEMPERATURE: 98.3 F | BODY MASS INDEX: 27 KG/M2 | HEIGHT: 61 IN | DIASTOLIC BLOOD PRESSURE: 78 MMHG | HEART RATE: 67 BPM | SYSTOLIC BLOOD PRESSURE: 120 MMHG

## 2019-03-25 DIAGNOSIS — M54.2 CERVICALGIA: Primary | ICD-10-CM

## 2019-03-25 DIAGNOSIS — M54.50 CHRONIC BILATERAL LOW BACK PAIN WITHOUT SCIATICA: ICD-10-CM

## 2019-03-25 DIAGNOSIS — R20.2 PARESTHESIA OF BOTH HANDS: ICD-10-CM

## 2019-03-25 DIAGNOSIS — G89.29 CHRONIC BILATERAL LOW BACK PAIN WITHOUT SCIATICA: ICD-10-CM

## 2019-03-25 PROCEDURE — 72110 X-RAY EXAM L-2 SPINE 4/>VWS: CPT | Performed by: NURSE PRACTITIONER

## 2019-03-25 PROCEDURE — 72110 X-RAY EXAM L-2 SPINE 4/>VWS: CPT | Performed by: RADIOLOGY

## 2019-03-25 PROCEDURE — 72050 X-RAY EXAM NECK SPINE 4/5VWS: CPT | Performed by: NURSE PRACTITIONER

## 2019-03-25 PROCEDURE — 99214 OFFICE O/P EST MOD 30 MIN: CPT | Performed by: NURSE PRACTITIONER

## 2019-03-25 PROCEDURE — 72050 X-RAY EXAM NECK SPINE 4/5VWS: CPT | Performed by: RADIOLOGY

## 2019-03-25 NOTE — PROGRESS NOTES
Subjective   Viktoria Mukherjee is a 48 y.o. female who presents due to back and neck pain. She also c/o numbness and tingling in right hand.    She present due to increased numbness/tingling in bilateral hands, worse in the right hand. She states sx began several months ago (mainly at night) but has increased in severity over the past couple of weeks (sx are now throughout the day). She works on a computer throughout the day which exacerbates symptoms. Denies weakness in upper and lower extremities.      Back Pain   This is a recurrent problem. The current episode started more than 1 year ago. The problem occurs intermittently. The problem has been gradually worsening since onset. The pain is present in the lumbar spine. The quality of the pain is described as aching. Worse during: worse after prolonged sitting and/or standing. The symptoms are aggravated by position, sitting and standing. Associated symptoms include numbness. Pertinent negatives include no abdominal pain (no change in bowel function), bladder incontinence, bowel incontinence, chest pain, fever, headaches or weakness. Risk factors: sits at computer throughout the day.        The following portions of the patient's history were reviewed and updated as appropriate: allergies, current medications, past social history and problem list.    Past Medical History:   Diagnosis Date   • Allergic rhinitis    • Asthma    • Disease of thyroid gland    • Tic disorder          Current Outpatient Medications:   •  desvenlafaxine (PRISTIQ) 50 MG 24 hr tablet, Take 50 mg by mouth Daily., Disp: , Rfl:   •  DULERA 100-5 MCG/ACT inhaler, INHALE TWO PUFFS BY MOUTH TWICE A DAY, Disp: 1 inhaler, Rfl: 1  •  estradiol (ESTRACE) 0.1 MG/GM vaginal cream, Insert 2 g into the vagina Daily., Disp: , Rfl:   •  guanFACINE (TENEX) 2 MG tablet, TAKE ONE TABLET BY MOUTH TWICE A DAY, Disp: 60 tablet, Rfl: 1  •  levothyroxine (SYNTHROID, LEVOTHROID) 88 MCG tablet, Take 88 mcg by mouth  Daily., Disp: , Rfl:   •  magnesium oxide (MAGOX) 400 (241.3 Mg) MG tablet tablet, Take 400 mg by mouth Daily., Disp: , Rfl:   •  modafinil (PROVIGIL) 100 MG tablet, Take 1 tablet by mouth 2 (Two) Times a Day., Disp: 60 tablet, Rfl: 5  •  montelukast (SINGULAIR) 10 MG tablet, TAKE ONE TABLET BY MOUTH DAILY, Disp: 30 tablet, Rfl: 3  •  Multiple Vitamin (MULTI VITAMIN DAILY) tablet, Take  by mouth., Disp: , Rfl:   •  NON FORMULARY, Testosterone cream by compound pharmacy., Disp: , Rfl:   •  Omega-3 Fatty Acids (FISH OIL) 1000 MG capsule capsule, Take  by mouth Daily With Breakfast., Disp: , Rfl:     Allergies   Allergen Reactions   • Codeine    • Epinephrine    • Latex Other (See Comments)   • Lidocaine        Review of Systems   Constitutional: Positive for activity change. Negative for appetite change, chills, fever and unexpected weight change.   HENT: Negative for ear pain, sinus pressure and sore throat.    Eyes: Negative for visual disturbance.   Respiratory: Negative for cough, shortness of breath and wheezing.    Cardiovascular: Negative for chest pain, palpitations and leg swelling.   Gastrointestinal: Negative for abdominal pain (no change in bowel function), blood in stool, bowel incontinence, constipation, diarrhea, nausea and vomiting.   Genitourinary: Negative for bladder incontinence, decreased urine volume, difficulty urinating (no change in bladder function), frequency, genital sores, hematuria and urgency.   Musculoskeletal: Positive for back pain, gait problem, neck pain and neck stiffness.   Skin: Negative for rash.   Neurological: Positive for numbness. Negative for dizziness, syncope, weakness, light-headedness and headaches.   Psychiatric/Behavioral: Negative for dysphoric mood.       Objective   Vitals:    03/25/19 0937   BP: 120/78   BP Location: Left arm   Patient Position: Sitting   Cuff Size: Adult   Pulse: 67   Temp: 98.3 °F (36.8 °C)   TempSrc: Oral   SpO2: 99%   Weight: 64.9 kg (143 lb)  "  Height: 154.7 cm (60.9\")     Physical Exam   Constitutional: She appears well-developed and well-nourished. She is cooperative. She does not have a sickly appearance. She does not appear ill.   HENT:   Head: Normocephalic.   Right Ear: Hearing, tympanic membrane and external ear normal.   Left Ear: Hearing, tympanic membrane and external ear normal.   Nose: Nose normal. No mucosal edema, rhinorrhea, sinus tenderness or nasal deformity. Right sinus exhibits no maxillary sinus tenderness and no frontal sinus tenderness. Left sinus exhibits no maxillary sinus tenderness and no frontal sinus tenderness.   Mouth/Throat: Oropharynx is clear and moist and mucous membranes are normal. Normal dentition.   Eyes: Conjunctivae and lids are normal. Right eye exhibits no discharge and no exudate. Left eye exhibits no discharge and no exudate.   Neck: Trachea normal. Muscular tenderness present. No spinous process tenderness present. Carotid bruit is not present. No edema and normal range of motion present. No thyroid mass present.   Cardiovascular: Regular rhythm, normal heart sounds and normal pulses.   No murmur heard.  Pulmonary/Chest: Breath sounds normal. No respiratory distress. She has no decreased breath sounds. She has no wheezes. She has no rhonchi. She has no rales.   Musculoskeletal:        Right wrist: She exhibits normal range of motion and no tenderness.        Left wrist: She exhibits normal range of motion and no tenderness.        Lumbar back: She exhibits tenderness. She exhibits no spasm.   Lymphadenopathy:        Head (right side): No submental, no submandibular, no tonsillar, no preauricular, no posterior auricular and no occipital adenopathy present.        Head (left side): No submental, no submandibular, no tonsillar, no preauricular, no posterior auricular and no occipital adenopathy present.   Neurological: She is alert. She has normal strength. No sensory deficit.   Skin: Skin is warm, dry and " intact. No cyanosis. Nails show no clubbing.       Assessment/Plan   Viktoria was seen today for wrist pain and back pain.    Diagnoses and all orders for this visit:    Cervicalgia  -     XR Spine Cervical Complete 4 or 5 View    Chronic bilateral low back pain without sciatica  -     XR Spine Lumbar 4+ View    Paresthesia of both hands  -     EMG Complete Min 5 Mus Each Extremity 69956 (x2) & Nerve Conduction Test Order; Future    No acute abnl noted on xrays-will send to radiology for review. I am concerned about possible CTS, paperwork completed so she may receive an ergonomic mouse and keyboard at work. However, due to progression of sx will schedule EMG to further evaluate.  Discussed PT referral for back and neck pain which she will consider if symptoms persist/worsen.

## 2019-03-27 DIAGNOSIS — R20.2 PARESTHESIA OF BOTH HANDS: Primary | ICD-10-CM

## 2019-04-23 ENCOUNTER — OFFICE VISIT (OUTPATIENT)
Dept: SLEEP MEDICINE | Facility: HOSPITAL | Age: 49
End: 2019-04-23
Attending: PSYCHIATRY & NEUROLOGY

## 2019-04-23 VITALS
HEART RATE: 64 BPM | OXYGEN SATURATION: 100 % | WEIGHT: 143.8 LBS | SYSTOLIC BLOOD PRESSURE: 118 MMHG | BODY MASS INDEX: 28.23 KG/M2 | DIASTOLIC BLOOD PRESSURE: 67 MMHG | HEIGHT: 60 IN

## 2019-04-23 DIAGNOSIS — G47.11 IDIOPATHIC HYPERSOMNIA WITH LONG SLEEP TIME: ICD-10-CM

## 2019-04-23 PROCEDURE — G0463 HOSPITAL OUTPT CLINIC VISIT: HCPCS

## 2019-04-23 RX ORDER — MODAFINIL 200 MG/1
200 TABLET ORAL DAILY
Qty: 30 TABLET | Refills: 5 | Status: SHIPPED | OUTPATIENT
Start: 2019-04-23 | End: 2020-07-21 | Stop reason: SDUPTHER

## 2019-04-25 ENCOUNTER — HOSPITAL ENCOUNTER (OUTPATIENT)
Dept: INFUSION THERAPY | Facility: HOSPITAL | Age: 49
Discharge: HOME | End: 2019-04-25
Admitting: NURSE PRACTITIONER

## 2019-04-25 DIAGNOSIS — R20.2 PARESTHESIA OF BOTH HANDS: ICD-10-CM

## 2019-04-25 PROCEDURE — 95910 NRV CNDJ TEST 7-8 STUDIES: CPT | Performed by: PSYCHIATRY & NEUROLOGY

## 2019-04-25 PROCEDURE — 95886 MUSC TEST DONE W/N TEST COMP: CPT | Performed by: PSYCHIATRY & NEUROLOGY

## 2019-04-25 PROCEDURE — 95886 MUSC TEST DONE W/N TEST COMP: CPT

## 2019-04-25 PROCEDURE — 95910 NRV CNDJ TEST 7-8 STUDIES: CPT

## 2019-04-25 RX ORDER — GUANFACINE 2 MG/1
TABLET ORAL
Qty: 60 TABLET | Refills: 5 | Status: SHIPPED | OUTPATIENT
Start: 2019-04-25 | End: 2020-03-09

## 2019-04-25 NOTE — PROCEDURES
EMG REPORT    Indication: Bilateral hand numbness    Findings: Bilateral median and ulnar sensory study showed normal latencies and amplitudes.  Right median motor study showed normal distal latency velocity and amplitude.  Left median and right ulnar motor study showed normal latencies and amplitudes.    Concentric needle EMG of bilateral deltoid, triceps, brachioradialis, extensor digitorum, and first dorsal interosseous muscles showed no abnormality.    Impression: Normal EMG nerve conduction studies of both upper extremities.       Srinath Meade M.D.

## 2019-04-27 DIAGNOSIS — J30.9 ALLERGIC RHINITIS: ICD-10-CM

## 2019-04-29 RX ORDER — MONTELUKAST SODIUM 10 MG/1
TABLET ORAL
Qty: 30 TABLET | Refills: 2 | Status: SHIPPED | OUTPATIENT
Start: 2019-04-29 | End: 2019-08-03 | Stop reason: SDUPTHER

## 2019-07-18 ENCOUNTER — APPOINTMENT (OUTPATIENT)
Dept: WOMENS IMAGING | Facility: HOSPITAL | Age: 49
End: 2019-07-18

## 2019-07-18 PROCEDURE — 77063 BREAST TOMOSYNTHESIS BI: CPT | Performed by: RADIOLOGY

## 2019-07-18 PROCEDURE — 77067 SCR MAMMO BI INCL CAD: CPT | Performed by: RADIOLOGY

## 2019-08-03 DIAGNOSIS — J30.9 ALLERGIC RHINITIS: ICD-10-CM

## 2019-08-05 RX ORDER — MONTELUKAST SODIUM 10 MG/1
TABLET ORAL
Qty: 30 TABLET | Refills: 1 | Status: SHIPPED | OUTPATIENT
Start: 2019-08-05 | End: 2019-10-02 | Stop reason: SDUPTHER

## 2019-08-30 ENCOUNTER — OFFICE VISIT (OUTPATIENT)
Dept: INTERNAL MEDICINE | Facility: CLINIC | Age: 49
End: 2019-08-30

## 2019-08-30 VITALS
TEMPERATURE: 98.9 F | OXYGEN SATURATION: 99 % | HEIGHT: 60 IN | BODY MASS INDEX: 28.47 KG/M2 | HEART RATE: 64 BPM | SYSTOLIC BLOOD PRESSURE: 120 MMHG | DIASTOLIC BLOOD PRESSURE: 76 MMHG | WEIGHT: 145 LBS

## 2019-08-30 DIAGNOSIS — M54.12 CERVICAL RADICULOPATHY: Primary | ICD-10-CM

## 2019-08-30 PROCEDURE — 99214 OFFICE O/P EST MOD 30 MIN: CPT | Performed by: NURSE PRACTITIONER

## 2019-08-31 NOTE — PROGRESS NOTES
Subjective   Viktoria Mukherjee is a 48 y.o. female who presents due to persistent right arm pain and progressive weakness.    She was seen 4/2019 due to bilateral arm and paresthesias. Her EMG did not show any acute abnl, cervical spine xray showed mild degenerative changes with disc space narrowing. She presents today due to progression of her symptoms, now persistent aching in her right upper arm extending into forearm. She continues to c/o intermittent tingling in her left arm. She also c/o right-sided neck pain and stiffness.      Arm Pain    Pertinent negatives include no chest pain.        The following portions of the patient's history were reviewed and updated as appropriate: allergies, current medications, past social history and problem list.    Past Medical History:   Diagnosis Date   • Allergic rhinitis    • Asthma    • Disease of thyroid gland    • Tic disorder          Current Outpatient Medications:   •  desvenlafaxine (PRISTIQ) 50 MG 24 hr tablet, Take 50 mg by mouth Daily., Disp: , Rfl:   •  DULERA 100-5 MCG/ACT inhaler, INHALE TWO PUFFS BY MOUTH TWICE A DAY, Disp: 13 g, Rfl: 2  •  estradiol (ESTRACE) 0.1 MG/GM vaginal cream, Insert 2 g into the vagina Daily., Disp: , Rfl:   •  guanFACINE (TENEX) 2 MG tablet, TAKE ONE TABLET BY MOUTH TWICE A DAY, Disp: 60 tablet, Rfl: 5  •  levothyroxine (SYNTHROID, LEVOTHROID) 88 MCG tablet, Take 88 mcg by mouth Daily., Disp: , Rfl:   •  magnesium oxide (MAGOX) 400 (241.3 Mg) MG tablet tablet, Take 400 mg by mouth Daily., Disp: , Rfl:   •  modafinil (PROVIGIL) 200 MG tablet, Take 1 tablet by mouth Daily., Disp: 30 tablet, Rfl: 5  •  montelukast (SINGULAIR) 10 MG tablet, TAKE ONE TABLET BY MOUTH DAILY, Disp: 30 tablet, Rfl: 1  •  Multiple Vitamin (MULTI VITAMIN DAILY) tablet, Take  by mouth., Disp: , Rfl:   •  NON FORMULARY, Testosterone vaginal tablet, Disp: , Rfl:   •  Omega-3 Fatty Acids (FISH OIL) 1000 MG capsule capsule, Take  by mouth Daily With Breakfast., Disp: ,  "Rfl:     Allergies   Allergen Reactions   • Codeine    • Epinephrine    • Latex Other (See Comments)   • Lidocaine        Review of Systems   Constitutional: Negative for chills, fatigue, fever and unexpected weight change.   HENT: Negative for congestion, ear pain, postnasal drip, sinus pressure, sore throat and trouble swallowing.    Eyes: Negative for visual disturbance.   Respiratory: Negative for cough, chest tightness and wheezing.    Cardiovascular: Negative for chest pain, palpitations and leg swelling.   Gastrointestinal: Negative for abdominal pain, blood in stool, nausea and vomiting.   Genitourinary: Negative for dysuria, frequency and urgency.   Musculoskeletal: Positive for myalgias, neck pain and neck stiffness. Negative for arthralgias and joint swelling.   Skin: Negative for color change.   Neurological: Negative for syncope, weakness and headaches.   Hematological: Does not bruise/bleed easily.       Objective   Vitals:    08/30/19 1515   BP: 120/76   BP Location: Left arm   Patient Position: Sitting   Cuff Size: Adult   Pulse: 64   Temp: 98.9 °F (37.2 °C)   TempSrc: Oral   SpO2: 99%   Weight: 65.8 kg (145 lb)   Height: 152.4 cm (60\")     Physical Exam   Constitutional: She appears well-developed and well-nourished. She is cooperative. She does not have a sickly appearance. She does not appear ill.   HENT:   Head: Normocephalic.   Right Ear: Hearing, tympanic membrane and external ear normal.   Left Ear: Hearing, tympanic membrane and external ear normal.   Nose: Nose normal. No mucosal edema, rhinorrhea, sinus tenderness or nasal deformity. Right sinus exhibits no maxillary sinus tenderness and no frontal sinus tenderness. Left sinus exhibits no maxillary sinus tenderness and no frontal sinus tenderness.   Mouth/Throat: Oropharynx is clear and moist and mucous membranes are normal. Normal dentition.   Eyes: Conjunctivae and lids are normal. Right eye exhibits no discharge and no exudate. Left eye " exhibits no discharge and no exudate.   Neck: Trachea normal. No spinous process tenderness and no muscular tenderness present. Carotid bruit is not present. No edema and normal range of motion present. No thyroid mass present.   Cardiovascular: Regular rhythm, normal heart sounds and normal pulses.   No murmur heard.  Pulmonary/Chest: Breath sounds normal. No respiratory distress. She has no decreased breath sounds. She has no wheezes. She has no rhonchi. She has no rales.   Musculoskeletal:        Right shoulder: She exhibits normal range of motion and no tenderness.        Right upper arm: She exhibits no tenderness.   Lymphadenopathy:        Head (right side): No submental, no submandibular, no tonsillar, no preauricular, no posterior auricular and no occipital adenopathy present.        Head (left side): No submental, no submandibular, no tonsillar, no preauricular, no posterior auricular and no occipital adenopathy present.   Neurological: She is alert.   Skin: Skin is warm, dry and intact. No cyanosis. Nails show no clubbing.       Assessment/Plan   Viktoria was seen today for arm pain.    Diagnoses and all orders for this visit:    Cervical radiculopathy  -     Cancel: MRI Cervical Spine Without Contrast; Future  -     MRI Cervical Spine Without Contrast; Future    Reviewed xray of cervical spine along with EMG, I am concerned her symptoms are radicular in nature from cervical spine. I am ordering a MRI of the spine to further evaluate. Discussed beginning Gabapentin which she has declined for now but will consider if needed.

## 2019-09-13 ENCOUNTER — OFFICE VISIT (OUTPATIENT)
Dept: INTERNAL MEDICINE | Facility: CLINIC | Age: 49
End: 2019-09-13

## 2019-09-13 VITALS
HEART RATE: 76 BPM | BODY MASS INDEX: 28.47 KG/M2 | OXYGEN SATURATION: 99 % | WEIGHT: 145 LBS | HEIGHT: 60 IN | SYSTOLIC BLOOD PRESSURE: 120 MMHG | DIASTOLIC BLOOD PRESSURE: 82 MMHG

## 2019-09-13 DIAGNOSIS — M54.12 CERVICAL RADICULOPATHY: Primary | ICD-10-CM

## 2019-09-13 PROCEDURE — 99213 OFFICE O/P EST LOW 20 MIN: CPT | Performed by: NURSE PRACTITIONER

## 2019-09-15 NOTE — PROGRESS NOTES
Subjective   Viktoria Mukherjee is a 48 y.o. female who presents for f/u regarding neck pain with right arm pain.    Her MRI of the cervical spine showed multilevel DDD of the cervical spine with mild spinal canal left axillary recess stenosis which is severe on the left and mild on the right. She reports improvement in right arm pain. She does continue to c/o neck pain and stiffness which is chronic and intermittent. EMG did not show any acute abnl.      Neck Pain    This is a recurrent problem. The current episode started more than 1 month ago. The problem occurs intermittently. The problem has been waxing and waning. Pertinent negatives include no chest pain, fever, headaches, trouble swallowing or weakness.        The following portions of the patient's history were reviewed and updated as appropriate: allergies, current medications, past social history and problem list.    Past Medical History:   Diagnosis Date   • Allergic rhinitis    • Asthma    • Disease of thyroid gland    • Tic disorder          Current Outpatient Medications:   •  desvenlafaxine (PRISTIQ) 50 MG 24 hr tablet, Take 50 mg by mouth Daily., Disp: , Rfl:   •  DULERA 100-5 MCG/ACT inhaler, INHALE TWO PUFFS BY MOUTH TWICE A DAY, Disp: 13 g, Rfl: 2  •  estradiol (ESTRACE) 0.1 MG/GM vaginal cream, Insert 2 g into the vagina Daily., Disp: , Rfl:   •  guanFACINE (TENEX) 2 MG tablet, TAKE ONE TABLET BY MOUTH TWICE A DAY, Disp: 60 tablet, Rfl: 5  •  levothyroxine (SYNTHROID, LEVOTHROID) 88 MCG tablet, Take 88 mcg by mouth Daily., Disp: , Rfl:   •  magnesium oxide (MAGOX) 400 (241.3 Mg) MG tablet tablet, Take 400 mg by mouth Daily., Disp: , Rfl:   •  modafinil (PROVIGIL) 200 MG tablet, Take 1 tablet by mouth Daily., Disp: 30 tablet, Rfl: 5  •  montelukast (SINGULAIR) 10 MG tablet, TAKE ONE TABLET BY MOUTH DAILY, Disp: 30 tablet, Rfl: 1  •  Multiple Vitamin (MULTI VITAMIN DAILY) tablet, Take  by mouth., Disp: , Rfl:   •  NON FORMULARY, Testosterone vaginal  "tablet, Disp: , Rfl:   •  Omega-3 Fatty Acids (FISH OIL) 1000 MG capsule capsule, Take  by mouth Daily With Breakfast., Disp: , Rfl:     Allergies   Allergen Reactions   • Codeine    • Epinephrine    • Latex Other (See Comments)   • Lidocaine        Review of Systems   Constitutional: Negative for chills, fatigue, fever and unexpected weight change.   HENT: Negative for congestion, ear pain, postnasal drip, sinus pressure, sore throat and trouble swallowing.    Eyes: Negative for visual disturbance.   Respiratory: Negative for cough, chest tightness and wheezing.    Cardiovascular: Negative for chest pain, palpitations and leg swelling.   Gastrointestinal: Negative for abdominal pain, blood in stool, nausea and vomiting.   Genitourinary: Negative for dysuria, frequency and urgency.   Musculoskeletal: Positive for neck pain and neck stiffness. Negative for arthralgias and joint swelling.   Skin: Negative for color change.   Neurological: Negative for syncope, weakness and headaches.   Hematological: Does not bruise/bleed easily.       Objective   Vitals:    09/13/19 1509   BP: 120/82   BP Location: Left arm   Patient Position: Sitting   Cuff Size: Adult   Pulse: 76   SpO2: 99%   Weight: 65.8 kg (145 lb)   Height: 152.4 cm (60\")     Physical Exam   Constitutional: She appears well-developed and well-nourished. She is cooperative. She does not have a sickly appearance. She does not appear ill.   HENT:   Head: Normocephalic.   Right Ear: Hearing, tympanic membrane and external ear normal.   Left Ear: Hearing, tympanic membrane and external ear normal.   Nose: Nose normal. No mucosal edema, rhinorrhea, sinus tenderness or nasal deformity. Right sinus exhibits no maxillary sinus tenderness and no frontal sinus tenderness. Left sinus exhibits no maxillary sinus tenderness and no frontal sinus tenderness.   Mouth/Throat: Oropharynx is clear and moist and mucous membranes are normal. Normal dentition.   Eyes: Conjunctivae " and lids are normal. Pupils are equal, round, and reactive to light. Right eye exhibits no discharge and no exudate. Left eye exhibits no discharge and no exudate.   Neck: Trachea normal. Muscular tenderness present. Carotid bruit is not present. No edema and normal range of motion present. No thyroid mass present.   Cardiovascular: Regular rhythm, normal heart sounds and normal pulses.   No murmur heard.  Pulmonary/Chest: Breath sounds normal. No respiratory distress. She has no decreased breath sounds. She has no wheezes. She has no rhonchi. She has no rales.   Lymphadenopathy:        Head (right side): No submental, no submandibular, no tonsillar, no preauricular, no posterior auricular and no occipital adenopathy present.        Head (left side): No submental, no submandibular, no tonsillar, no preauricular, no posterior auricular and no occipital adenopathy present.   Neurological: She is alert. She has normal strength. No sensory deficit.   Skin: Skin is warm, dry and intact. No cyanosis. Nails show no clubbing.       Assessment/Plan   Viktoria was seen today for follow-up.    Diagnoses and all orders for this visit:    Cervical radiculopathy  -     Ambulatory Referral to Physical Therapy Evaluate and treat    Reviewed results of MRI with patient and discussed chronicity of neck stiffness. She may take Ibuprofen as needed for inflammation but will also begin PT for further stretching exercises. Consider further evaluation if sx persist (has appt in October with Dr. Husain).

## 2019-10-01 ENCOUNTER — OFFICE VISIT (OUTPATIENT)
Dept: INTERNAL MEDICINE | Facility: CLINIC | Age: 49
End: 2019-10-01

## 2019-10-01 VITALS
HEIGHT: 60 IN | WEIGHT: 146 LBS | DIASTOLIC BLOOD PRESSURE: 80 MMHG | HEART RATE: 55 BPM | OXYGEN SATURATION: 99 % | SYSTOLIC BLOOD PRESSURE: 132 MMHG | BODY MASS INDEX: 28.66 KG/M2

## 2019-10-01 DIAGNOSIS — Z00.00 ANNUAL PHYSICAL EXAM: Primary | ICD-10-CM

## 2019-10-01 LAB
ALBUMIN SERPL-MCNC: 4.7 G/DL (ref 3.5–5.2)
ALBUMIN/GLOB SERPL: 2.2 G/DL
ALP SERPL-CCNC: 39 U/L (ref 39–117)
ALT SERPL W P-5'-P-CCNC: 14 U/L (ref 1–33)
ANION GAP SERPL CALCULATED.3IONS-SCNC: 11.6 MMOL/L (ref 5–15)
AST SERPL-CCNC: 15 U/L (ref 1–32)
BILIRUB SERPL-MCNC: 0.5 MG/DL (ref 0.2–1.2)
BILIRUB UR QL STRIP: NEGATIVE
BUN BLD-MCNC: 13 MG/DL (ref 6–20)
BUN/CREAT SERPL: 13.7 (ref 7–25)
CALCIUM SPEC-SCNC: 9.2 MG/DL (ref 8.6–10.5)
CHLORIDE SERPL-SCNC: 99 MMOL/L (ref 98–107)
CHOLEST SERPL-MCNC: 201 MG/DL (ref 0–200)
CLARITY UR: CLEAR
CO2 SERPL-SCNC: 26.4 MMOL/L (ref 22–29)
COLOR UR: YELLOW
CREAT BLD-MCNC: 0.95 MG/DL (ref 0.57–1)
DEPRECATED RDW RBC AUTO: 41.3 FL (ref 37–54)
ERYTHROCYTE [DISTWIDTH] IN BLOOD BY AUTOMATED COUNT: 11.5 % (ref 12.3–15.4)
GFR SERPL CREATININE-BSD FRML MDRD: 63 ML/MIN/1.73
GLOBULIN UR ELPH-MCNC: 2.1 GM/DL
GLUCOSE BLD-MCNC: 106 MG/DL (ref 65–99)
GLUCOSE UR STRIP-MCNC: NEGATIVE MG/DL
HCT VFR BLD AUTO: 41.7 % (ref 34–46.6)
HDLC SERPL-MCNC: 60 MG/DL (ref 40–60)
HGB BLD-MCNC: 14.5 G/DL (ref 12–15.9)
HGB UR QL STRIP.AUTO: NEGATIVE
KETONES UR QL STRIP: NEGATIVE
LDLC SERPL CALC-MCNC: 128 MG/DL (ref 0–100)
LDLC/HDLC SERPL: 2.13 {RATIO}
LEUKOCYTE ESTERASE UR QL STRIP.AUTO: NEGATIVE
MCH RBC QN AUTO: 34.4 PG (ref 26.6–33)
MCHC RBC AUTO-ENTMCNC: 34.8 G/DL (ref 31.5–35.7)
MCV RBC AUTO: 98.8 FL (ref 79–97)
NITRITE UR QL STRIP: NEGATIVE
PH UR STRIP.AUTO: 7 [PH] (ref 5–8)
PLATELET # BLD AUTO: 197 10*3/MM3 (ref 140–450)
PMV BLD AUTO: 11.2 FL (ref 6–12)
POTASSIUM BLD-SCNC: 4.3 MMOL/L (ref 3.5–5.2)
PROT SERPL-MCNC: 6.8 G/DL (ref 6–8.5)
PROT UR QL STRIP: NEGATIVE
RBC # BLD AUTO: 4.22 10*6/MM3 (ref 3.77–5.28)
SODIUM BLD-SCNC: 137 MMOL/L (ref 136–145)
SP GR UR STRIP: 1.01 (ref 1–1.03)
TRIGL SERPL-MCNC: 66 MG/DL (ref 0–150)
TSH SERPL DL<=0.05 MIU/L-ACNC: 2.36 UIU/ML (ref 0.27–4.2)
UROBILINOGEN UR QL STRIP: NORMAL
VLDLC SERPL-MCNC: 13.2 MG/DL (ref 5–40)
WBC NRBC COR # BLD: 5.16 10*3/MM3 (ref 3.4–10.8)

## 2019-10-01 PROCEDURE — 93000 ELECTROCARDIOGRAM COMPLETE: CPT | Performed by: INTERNAL MEDICINE

## 2019-10-01 PROCEDURE — 84443 ASSAY THYROID STIM HORMONE: CPT | Performed by: INTERNAL MEDICINE

## 2019-10-01 PROCEDURE — 85027 COMPLETE CBC AUTOMATED: CPT | Performed by: INTERNAL MEDICINE

## 2019-10-01 PROCEDURE — 81003 URINALYSIS AUTO W/O SCOPE: CPT | Performed by: INTERNAL MEDICINE

## 2019-10-01 PROCEDURE — 99396 PREV VISIT EST AGE 40-64: CPT | Performed by: INTERNAL MEDICINE

## 2019-10-01 PROCEDURE — 80053 COMPREHEN METABOLIC PANEL: CPT | Performed by: INTERNAL MEDICINE

## 2019-10-01 PROCEDURE — 80061 LIPID PANEL: CPT | Performed by: INTERNAL MEDICINE

## 2019-10-01 NOTE — PROGRESS NOTES
Chief Complaint   Patient presents with   • Annual Exam       Subjective   Viktoria Mukherjee is a 48 y.o. female.     History of Present Illness     She is here for annual examination.    She generally feels healthy.  She does have some increased stress.  She has a 13-year-old daughter.  Her parents live in [place].  Her father has dementia.  Her mother is trying to manage.  Her appetite is good.  She is trying to follow a prudent diet.  She is sleeping okay.  Her energy comes and goes.  She tries to exercise regularly but has not been able to recently.  She is up-to-date on dental and eye exams.  She sees her gynecologist for Pap smears and mammograms and is up-to-date on those.           The following portions of the patient's history were reviewed and updated as appropriate: allergies, current medications, past family history, past medical history, past social history, past surgical history and problem list.    Review of Systems   Constitutional: Negative for appetite change, chills, fatigue, fever, unexpected weight gain and unexpected weight loss.   HENT: Positive for sinus pressure (seasonal). Negative for congestion, nosebleeds, sore throat and trouble swallowing.    Eyes: Negative for blurred vision and double vision.        Wears glasses, bifocals   Respiratory: Negative for cough and shortness of breath.    Cardiovascular: Negative for chest pain, palpitations and leg swelling.   Gastrointestinal: Positive for constipation. Negative for abdominal pain, diarrhea, nausea, vomiting and indigestion.   Endocrine: Positive for cold intolerance (make her joints hurt more). Negative for heat intolerance, polydipsia and polyuria.   Genitourinary: Negative for breast discharge, breast lump, dysuria and frequency.   Musculoskeletal: Positive for arthralgias and back pain.   Skin: Negative for rash.   Allergic/Immunologic: Positive for environmental allergies.   Neurological: Negative for dizziness and headache.  "  Hematological: Negative for adenopathy. Does not bruise/bleed easily.   Psychiatric/Behavioral: Negative for sleep disturbance and depressed mood. The patient is not nervous/anxious.          Current Outpatient Medications:   •  desvenlafaxine (PRISTIQ) 50 MG 24 hr tablet, Take 50 mg by mouth Daily., Disp: , Rfl:   •  DULERA 100-5 MCG/ACT inhaler, INHALE TWO PUFFS BY MOUTH TWICE A DAY, Disp: 13 g, Rfl: 2  •  estradiol (ESTRACE) 0.1 MG/GM vaginal cream, Insert 2 g into the vagina Daily., Disp: , Rfl:   •  guanFACINE (TENEX) 2 MG tablet, TAKE ONE TABLET BY MOUTH TWICE A DAY, Disp: 60 tablet, Rfl: 5  •  levothyroxine (SYNTHROID, LEVOTHROID) 88 MCG tablet, Take 88 mcg by mouth Daily., Disp: , Rfl:   •  magnesium oxide (MAGOX) 400 (241.3 Mg) MG tablet tablet, Take 400 mg by mouth Daily., Disp: , Rfl:   •  modafinil (PROVIGIL) 200 MG tablet, Take 1 tablet by mouth Daily., Disp: 30 tablet, Rfl: 5  •  montelukast (SINGULAIR) 10 MG tablet, TAKE ONE TABLET BY MOUTH DAILY, Disp: 30 tablet, Rfl: 1  •  Multiple Vitamin (MULTI VITAMIN DAILY) tablet, Take  by mouth., Disp: , Rfl:   •  NON FORMULARY, Testosterone vaginal tablet, Disp: , Rfl:   •  Omega-3 Fatty Acids (FISH OIL) 1000 MG capsule capsule, Take  by mouth Daily With Breakfast., Disp: , Rfl:         Objective     /80   Pulse 55   Ht 152.4 cm (60\")   Wt 66.2 kg (146 lb)   SpO2 99%   BMI 28.51 kg/m²       Physical Exam   Constitutional: She is oriented to person, place, and time. She appears well-developed and well-nourished. No distress.   HENT:   Right Ear: Tympanic membrane and ear canal normal.   Left Ear: Tympanic membrane and ear canal normal.   Nose: Right sinus exhibits no maxillary sinus tenderness and no frontal sinus tenderness. Left sinus exhibits no maxillary sinus tenderness and no frontal sinus tenderness.   Mouth/Throat: Oropharynx is clear and moist.   Eyes: Conjunctivae and EOM are normal. Pupils are equal, round, and reactive to light. No " scleral icterus.   Neck: Normal range of motion. Neck supple. Normal carotid pulses present. Carotid bruit is not present. No tracheal deviation present. No thyromegaly present.   Cardiovascular: Regular rhythm, S1 normal, S2 normal and intact distal pulses. Bradycardia present. Exam reveals no gallop and no friction rub.   No murmur heard.  Pulses:       Carotid pulses are 2+ on the right side, and 2+ on the left side.  Pulmonary/Chest: Effort normal and breath sounds normal. She has no wheezes. She has no rhonchi. She has no rales. Chest wall is not dull to percussion.   Abdominal: Soft. Normal appearance and bowel sounds are normal. She exhibits no abdominal bruit. There is no hepatosplenomegaly. There is no tenderness. There is no rebound and no guarding.   Musculoskeletal: She exhibits no edema.   Lymphadenopathy:     She has no cervical adenopathy.   Neurological: She is alert and oriented to person, place, and time. No cranial nerve deficit. Coordination normal.   Skin: Skin is warm and dry.   Psychiatric: She has a normal mood and affect.   Nursing note and vitals reviewed.      ECG 12 Lead  Date/Time: 10/1/2019 8:28 AM  Performed by: Radha Gama  Authorized by: Maureen Husain MD   Comparison: compared with previous ECG from 9/27/2018  Similar to previous ECG  Rhythm: sinus bradycardia  Rate: bradycardic  BPM: 57  Conduction: conduction normal  ST Segments: ST segments normal  T Waves: T waves normal  QRS axis: normal  Clinical impression comment: Normal except for rate              Assessment/Plan   Viktoria was seen today for annual exam.    Diagnoses and all orders for this visit:    Annual physical exam  -     ECG 12 Lead  -     Comprehensive Metabolic Panel; Future  -     CBC (No Diff); Future  -     Lipid Panel; Future  -     Urinalysis With Microscopic If Indicated (No Culture) - Urine, Clean Catch; Future  -     TSH Rfx On Abnormal To Free T4; Future      Encouraged prudent diet, regular exercise,  maintenance of healthy weight, good sleep habits,  avoidance of tobacco products, excessive alcohol.  She will get her flu shot at work.

## 2019-10-02 DIAGNOSIS — J30.9 ALLERGIC RHINITIS: ICD-10-CM

## 2019-10-02 RX ORDER — MONTELUKAST SODIUM 10 MG/1
TABLET ORAL
Qty: 30 TABLET | Refills: 5 | Status: SHIPPED | OUTPATIENT
Start: 2019-10-02 | End: 2020-03-09

## 2019-10-07 ENCOUNTER — HOSPITAL ENCOUNTER (OUTPATIENT)
Dept: PHYSICAL THERAPY | Facility: HOSPITAL | Age: 49
Setting detail: THERAPIES SERIES
Discharge: HOME | End: 2019-10-07

## 2019-10-07 DIAGNOSIS — R29.3 POOR POSTURE: ICD-10-CM

## 2019-10-07 DIAGNOSIS — M54.2 CERVICAL PAIN: Primary | ICD-10-CM

## 2019-10-07 PROCEDURE — 97161 PT EVAL LOW COMPLEX 20 MIN: CPT

## 2019-10-07 PROCEDURE — 97012 MECHANICAL TRACTION THERAPY: CPT

## 2019-10-07 NOTE — THERAPY EVALUATION
"    Outpatient Physical Therapy Ortho Initial Evaluation  [facility]     Patient Name: Viktoria Mukherjee  : 1970  MRN: [id number]  Today's Date: 10/7/2019      Visit Date: 10/07/2019    Patient Active Problem List   Diagnosis   • Hypothyroidism, adult   • Allergic rhinitis   • Asthma   • Tic disorder   • Idiopathic hypersomnia with long sleep time   • Chronic fatigue   • Sleep concern   • Bruxism        Past Medical History:   Diagnosis Date   • Allergic rhinitis    • Asthma    • Disease of thyroid gland    • Tic disorder         Past Surgical History:   Procedure Laterality Date   •  SECTION  2006   • DIAGNOSTIC LAPAROSCOPY         Visit Dx:     ICD-10-CM ICD-9-CM   1. Cervical pain M54.2 723.1   2. Poor posture R29.3 781.92         Patient History     Row Name 10/07/19 1600             History    Brief Description of Current Complaint  Pt reports OA in neck and low back. Pt reports numbness through R UE as well as burning thorugh posterior neck which wakes her at night at times. Radiating pain into R elbow, worse with weight lifting. Pt reports longer work hours recently, stress and ergonomics at work which has seemed to flare it up recently. Pt also complains of low back pain, thoracic pain at times and foot numbness.   -CN      Patient/Caregiver Goals  Relieve pain;Return to prior level of function;Know what to do to help the symptoms  -CN      Hand Dominance  right-handed  -CN      What clinical tests have you had for this problem?  MRI  -CN         Pain     Pain Location  Neck  -CN      Pain at Present  2  -CN      Pain at Best  0  -CN      Pain at Worst  3  -CN      Pain Frequency  Intermittent  -CN      Pain Description  Burning;Numbness \"funny bone\" pain in the muscle  -CN      What Performance Factors Make the Current Problem(s) WORSE?  Cervical ROM causes tightness, numbness with reaching activities, gripping  -CN      What Performance Factors Make the Current Problem(s) BETTER?  " Ice  -CN      Is your sleep disturbed?  Yes  -CN      Difficulties at work?  Yes,  on computer most of the time  -CN      Difficulties with ADL's?  Able to perform all ADLs with minimal symptoms  -CN      Difficulties with recreational activities?  Yes, weight lifting  -CN         Fall Risk Assessment    Any falls in the past year:  No  -CN         Services    Are you currently receiving Home Health services  No  -CN         Daily Activities    Primary Language  English  -CN      Are you able to read  Yes  -CN      Are you able to write  Yes  -CN      How does patient learn best?  Reading  -CN      Barriers to learning  None  -CN      Pt Participated in POC and Goals  Yes  -CN         Safety    Are you being hurt, hit, or frightened by anyone at home or in your life?  No  -CN      Are you being neglected by a caregiver  No  -CN        User Key  (r) = Recorded By, (t) = Taken By, (c) = Cosigned By    Initials Name Provider Type    CN Dorina Cintron, PT Physical Therapist          PT Ortho     Row Name 10/07/19 1600       Posture/Observations    Alignment Options  Forward head;Rounded shoulders  -CN    Forward Head  Mild  -CN    Rounded Shoulders  Mild  -CN       Sensory Screen for Light Touch- Upper Quarter Clearing    C4 (posterior shoulder)  Bilateral:;Intact  -CN    C5 (lateral upper arm)  Bilateral:;Intact  -CN    C6 (tip of thumb)  Bilateral:;Intact  -CN    C7 (tip of 3rd finger)  Bilateral:;Intact  -CN    C8 (tip of 5th finger)  Bilateral:;Intact  -CN    T1 (medial lower arm)  Bilateral:;Intact  -CN       Myotomal Screen- Upper Quarter Clearing    Shoulder flexion (C5)  Bilateral:;4+ (Good +)  -CN    Elbow flexion/wrist extension (C6)  Bilateral:;4+ (Good +)  -CN    Elbow extension/wrist flexion (C7)  Bilateral:;4+ (Good +)  -CN       Cervical/Shoulder ROM Screen    Cervical flexion  Normal with tightness  -CN    Cervical extension  Normal with tightness  -CN    Cervical lateral flexion   Impaired R=75%, L=100%  -CN    Cervical rotation  Normal  -CN       Cervical Palpation    Suboccipital  Bilateral:;Tender  -CN    Levator Scapula  Bilateral:;Tender  -CN    Upper Traps  Bilateral:;Tender;Guarded/taut  -CN    Middle Traps  Bilateral:;Tender  -CN       Cervical Accessory Motions    PA glide- C4  Hypomobile  -CN    PA glide- C5  Hypomobile  -CN    PA glide- C7  Hypomobile  -CN        Strength Right    Right  Test 1  60  -CN    Right  Test 2  58  -CN    Right  Test 3  56  -CN     Strength Average Right  58  -CN        Strength Left    Left  Test 1  57  -CN    Left  Test 2  58  -CN    Left  Test 3  57  -CN     Strength Average Left  57.33  -CN       Upper Extremity Flexibility    Pect Minor  Bilateral:;Mildly limited  -CN    Pect Major  Bilateral:;Mildly limited  -CN      User Key  (r) = Recorded By, (t) = Taken By, (c) = Cosigned By    Initials Name Provider Type    Dorina Brown, PT Physical Therapist                      Therapy Education  Education Details: Anatomy, goals of PT, eval findings, role of posture and work ergonomics on condition  Given: HEP, Symptoms/condition management, Pain management, Posture/body mechanics  Program: New  How Provided: Verbal, Demonstration, Written  Provided to: Patient  Level of Understanding: Teach back education performed, Verbalized, Demonstrated     PT OP Goals     Row Name 10/07/19 1700          PT Short Term Goals    STG Date to Achieve  10/28/19  -CN     STG 1  Pt will report pain rated 2/10 at worst in order to demonstrate ability to return to normalized ADLs and functional activities.  -CN     STG 1 Progress  New  -CN     STG 2  Pt will be independent with initial HEP for symptom management.  -CN     STG 2 Progress  New  -CN        Long Term Goals    LTG Date to Achieve  11/18/19  -CN     LTG 1  Pt will be independent and compliant with advanced HEP for long term management of symptoms and prevention of  future occurrence.   -CN     LTG 1 Progress  New  -CN     LTG 2  Pt will reduce level of perceived disability as measured by the NDI  to 8% in order to improve QOL.  -CN     LTG 2 Progress  New  -CN     LTG 3  Pt will report 50% improvement in radicular symptoms to R UE.   -CN     LTG 3 Progress  New  -CN        Time Calculation    PT Goal Re-Cert Due Date  11/07/19  -CN       User Key  (r) = Recorded By, (t) = Taken By, (c) = Cosigned By    Initials Name Provider Type    Dorina Brown, PT Physical Therapist          PT Assessment/Plan     Row Name 10/07/19 7599          PT Assessment    Functional Limitations  Limitations in functional capacity and performance;Performance in leisure activities;Performance in self-care ADL;Performance in work activities  -CN     Impairments  Impaired flexibility;Muscle strength;Sensation;Pain;Range of motion;Posture  -CN     Assessment Comments  48 y.o. female referred to outpatient physical therapy for evaluation and treatment of right cervical radiculopathy.  Patient presents with forward head, rounded shoulders hypomobility at C/T junction, increased tenderness over bony prominences as well as UT, infraspinatus, suboccipitals and pec tissues. Pt reports intermittent numbness and burning in R UE which is worse with reaching OH and to the side.  Pt's signs and symptoms are consistent with referring diagnosis.  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  Pt scored 18% on the NDI where 0% is no disability and is in stable clinical condition. Pt would benefit from skilled PT to address functional deficits and return to PLOF.  -CN     Please refer to paper survey for additional self-reported information  Yes  -CN     Rehab Potential  Good  -CN     Patient/caregiver participated in establishment of treatment plan and goals  Yes  -CN     Patient would benefit from  skilled therapy intervention  Yes  -CN        PT Plan    PT Frequency  1x/week;2x/week  -CN     Predicted Duration of Therapy Intervention (Therapy Eval)  6 weeks  -CN     Planned CPT's?  PT EVAL LOW COMPLEXITY: 97477;PT RE-EVAL: 27012;PT THER PROC EA 15 MIN: 15098;PT THER ACT EA 15 MIN: 72742;PT MANUAL THERAPY EA 15 MIN: 34342;PT NEUROMUSC RE-EDUCATION EA 15 MIN: 18429;PT HOT OR COLD PACK TREAT MCARE;PT ELECTRICAL STIM UNATTEND: ;PT ULTRASOUND EA 15 MIN: 47645;PT TRACTION CERVICAL: 19026  -CN     PT Plan Comments  Assess response to cervical traciton and continue if beneficial. Consider manual to affected tissues, PA mobs, chin tucks, exercises over noodle, rows, UT stretch next visit.   -CN       User Key  (r) = Recorded By, (t) = Taken By, (c) = Cosigned By    Initials Name Provider Type    CN Dorina Cintron, PT Physical Therapist          Modalities     Row Name 10/07/19 1700             Moist Heat    MH Applied  Yes  -CN      Location  cervical spine with mechanical traction  -CN      Rx Minutes  10 mins  -CN         Traction 15861    Traction Type  Cervical  -CN      Rx Minutes  10  -CN      Duration  Intermittent  -CN      Position  Hook-lying  -CN      Weight  15 /8  -CN      Hold  40  -CN      Relax  10  -CN        User Key  (r) = Recorded By, (t) = Taken By, (c) = Cosigned By    Initials Name Provider Type    CN Dorina Citnron, PT Physical Therapist                          Outcome Measure Options: Neck Disability Index (NDI)(18% where 0% is no disability)         Time Calculation:     Start Time: 1605  Stop Time: 1645  Time Calculation (min): 40 min     Therapy Charges for Today     Code Description Service Date Service Provider Modifiers Qty    34937747059 HC PT HOT OR COLD PACK TREAT MCARE 10/7/2019 Dorina Cintron, PT GP 1    80402238247 HC PT-TRACTION MECHANICAL 10/7/2019 Dorina Cintron, PT  1    32394008192 HC PT EVAL LOW COMPLEXITY 2 10/7/2019 Dorina Cintron  Nancy, PT GP 1          PT G-Codes  Outcome Measure Options: Neck Disability Index (NDI)(18% where 0% is no disability)         Dorina Cintron, PT  10/7/2019

## 2019-10-23 ENCOUNTER — APPOINTMENT (OUTPATIENT)
Dept: PHYSICAL THERAPY | Facility: HOSPITAL | Age: 49
End: 2019-10-23

## 2019-10-25 ENCOUNTER — APPOINTMENT (OUTPATIENT)
Dept: PHYSICAL THERAPY | Facility: HOSPITAL | Age: 49
End: 2019-10-25

## 2019-10-28 ENCOUNTER — TELEPHONE (OUTPATIENT)
Dept: PHYSICAL THERAPY | Facility: HOSPITAL | Age: 49
End: 2019-10-28

## 2019-10-28 NOTE — TELEPHONE ENCOUNTER
LM with pt re: missed visit this morning in outpatient PT.  Reminded her of her upcoming appointment on 10/30/19 at 0745, and if she is unable to make the appointment to please call and cancel.

## 2019-10-30 ENCOUNTER — HOSPITAL ENCOUNTER (OUTPATIENT)
Dept: PHYSICAL THERAPY | Facility: HOSPITAL | Age: 49
Setting detail: THERAPIES SERIES
Discharge: HOME | End: 2019-10-30

## 2019-10-30 DIAGNOSIS — M54.2 CERVICAL PAIN: Primary | ICD-10-CM

## 2019-10-30 DIAGNOSIS — R29.3 POOR POSTURE: ICD-10-CM

## 2019-10-30 PROCEDURE — 97110 THERAPEUTIC EXERCISES: CPT

## 2019-10-30 PROCEDURE — 97140 MANUAL THERAPY 1/> REGIONS: CPT

## 2019-10-30 NOTE — THERAPY TREATMENT NOTE
Outpatient Physical Therapy Ortho Treatment Note  [facility]     Patient Name: Viktoria Mukherjee  : 1970  MRN: [id number]  Today's Date: 10/30/2019      Visit Date: 10/30/2019    Visit Dx:    ICD-10-CM ICD-9-CM   1. Cervical pain M54.2 723.1   2. Poor posture R29.3 781.92       Patient Active Problem List   Diagnosis   • Hypothyroidism, adult   • Allergic rhinitis   • Asthma   • Tic disorder   • Idiopathic hypersomnia with long sleep time   • Chronic fatigue   • Sleep concern   • Bruxism        Past Medical History:   Diagnosis Date   • Allergic rhinitis    • Asthma    • Disease of thyroid gland    • Tic disorder         Past Surgical History:   Procedure Laterality Date   •  SECTION  2006   • DIAGNOSTIC LAPAROSCOPY                         PT Assessment/Plan     Row Name 10/30/19 0843          PT Assessment    Assessment Comments  Ms. Mukherjee returns for first follow up appointment after eval reporting slight improvement in symptoms. She c/o of cont achiness throughout neck and upper mid-back, though symptoms to UE appear to be less. Manual therapy trialed w/ good tolerance and improvement in reported pain. HEP issued to include chin tucks, openbooks, scap squeeze, rows, and UT stretch w/ pt demonstrating good form and understanding of frequency which to perform.   -        PT Plan    PT Plan Comments  assess response to manual therapy and HEP. progress postural exercise to include HA over noodle, alternating shld flx over noodle, shld ext w/ TB, doorway pec stretch etc as tolerated  -       User Key  (r) = Recorded By, (t) = Taken By, (c) = Cosigned By    Initials Name Provider Type    Skye Anglin, PT Physical Therapist            OP Exercises     Row Name 10/30/19 0700             Subjective Comments    Subjective Comments  Pt reports she feels good overall. Mild achiness, but would like some exercises to work on at home and at work to prevent symptoms  -          Subjective Pain    Able to rate subjective pain?  yes  -      Pre-Treatment Pain Level  2  -         Total Minutes    07402 - PT Therapeutic Exercise Minutes  25  -JH      96274 - PT Manual Therapy Minutes  15  -JH         Exercise 1    Exercise Name 1  chin tucks supine  -      Reps 1  10  -      Time 1  5  -JH         Exercise 2    Exercise Name 2  HA over noodle  -      Reps 2  --  -      Time 2  --  -      Additional Comments  next session  -         Exercise 3    Exercise Name 3  alternating shoulder flx over noodle  -      Reps 3  --  -      Additional Comments  next session  -         Exercise 4    Exercise Name 4  rows w/ TB  -      Reps 4  20  -      Time 4  RTB  -         Exercise 5    Exercise Name 5  UT stretch at chair  -      Reps 5  3  -JH      Time 5  20  -         Exercise 6    Exercise Name 6  scap squeeze  -      Reps 6  10  -      Time 6  5 seconds  -         Exercise 7    Exercise Name 7  openbooks  -      Reps 7  10B  -        User Key  (r) = Recorded By, (t) = Taken By, (c) = Cosigned By    Initials Name Provider Type     Skye Carmen, PT Physical Therapist                      Manual Rx (last 36 hours)      Manual Treatments     Row Name 10/30/19 0700             Total Minutes    65207 - PT Manual Therapy Minutes  15  -JH         Manual Rx 1    Manual Rx 1 Location  neck  -JH      Manual Rx 1 Type  cerv dx, upglide lower cervical B   -      Manual Rx 1 Grade  III  -JH         Manual Rx 2    Manual Rx 2 Location  neck  -JH      Manual Rx 2 Type  STM suboccipitals, levator, UT B  -      Manual Rx 2 Grade  moderate pressure  -        User Key  (r) = Recorded By, (t) = Taken By, (c) = Cosigned By    Initials Name Provider Type     Skye Carmen, PT Physical Therapist          PT OP Goals     Row Name 10/30/19 0800          PT Short Term Goals    STG Date to Achieve  10/28/19  -     STG 1  Pt will report pain rated 2/10 at worst in order  to demonstrate ability to return to normalized ADLs and functional activities.  -     STG 1 Progress  New  -     STG 2  Pt will be independent with initial HEP for symptom management.  -     STG 2 Progress  Ongoing;Progressing  -     STG 2 Progress Comments  HEP issued this date w/ good form noted  -        Long Term Goals    LTG Date to Achieve  11/18/19  -     LTG 1  Pt will be independent and compliant with advanced HEP for long term management of symptoms and prevention of future occurrence.   -     LTG 1 Progress  New  -     LTG 2  Pt will reduce level of perceived disability as measured by the NDI  to 8% in order to improve QOL.  -     LTG 2 Progress  New  -     LTG 3  Pt will report 50% improvement in radicular symptoms to R UE.   -     LTG 3 Progress  New  -       User Key  (r) = Recorded By, (t) = Taken By, (c) = Cosigned By    Initials Name Provider Type    Skye Anglin, PT Physical Therapist          Therapy Education  Education Details: HEP of chin tucks, scap squeeze, rows, openbooks and UT stretch given  Given: HEP, Symptoms/condition management, Pain management, Posture/body mechanics  Program: Reinforced  How Provided: Verbal, Demonstration, Written  Provided to: Patient  Level of Understanding: Teach back education performed, Verbalized, Demonstrated              Time Calculation:   Start Time: 0745  Stop Time: 0825  Time Calculation (min): 40 min  Therapy Charges for Today     Code Description Service Date Service Provider Modifiers Qty    30599065268  PT THER PROC EA 15 MIN 10/30/2019 Skye Carmen, PT GP 2    [id number]  PT MANUAL THERAPY EA 15 MIN 10/30/2019 Skye Carmen, PT GP 1                    Skye Carmen PT  10/30/2019

## 2019-11-06 ENCOUNTER — HOSPITAL ENCOUNTER (OUTPATIENT)
Dept: PHYSICAL THERAPY | Facility: HOSPITAL | Age: 49
Setting detail: THERAPIES SERIES
Discharge: HOME | End: 2019-11-06

## 2019-11-06 DIAGNOSIS — R29.3 POOR POSTURE: ICD-10-CM

## 2019-11-06 DIAGNOSIS — M54.2 CERVICAL PAIN: Primary | ICD-10-CM

## 2019-11-06 PROCEDURE — 97140 MANUAL THERAPY 1/> REGIONS: CPT

## 2019-11-06 PROCEDURE — 97110 THERAPEUTIC EXERCISES: CPT

## 2019-11-07 NOTE — THERAPY TREATMENT NOTE
Outpatient Physical Therapy Ortho Treatment Note  [facility]     Patient Name: Viktoria Mukherjee  : 1970  MRN: [id number]  Today's Date: 2019      Visit Date: 2019    Visit Dx:    ICD-10-CM ICD-9-CM   1. Cervical pain M54.2 723.1   2. Poor posture R29.3 781.92       Patient Active Problem List   Diagnosis   • Hypothyroidism, adult   • Allergic rhinitis   • Asthma   • Tic disorder   • Idiopathic hypersomnia with long sleep time   • Chronic fatigue   • Sleep concern   • Bruxism        Past Medical History:   Diagnosis Date   • Allergic rhinitis    • Asthma    • Disease of thyroid gland    • Tic disorder         Past Surgical History:   Procedure Laterality Date   •  SECTION  2006   • DIAGNOSTIC LAPAROSCOPY                         PT Assessment/Plan     Row Name 19 1600          PT Assessment    Assessment Comments  Pt reports soreness last night, however attributes much of this to positioning and stress at work. Pt compliant with home program and progressed several exercises over noodle today. Pt reports increased tingling at times with scap squeeze and worked on correct mechanics as pt tends to perform chin jut at times with movement. Pt able to perform small scap squeeze with correct form and no increase in radicular symptoms with cues.   -CN        PT Plan    PT Plan Comments  Continue with progressive postural strengthening and traction prn.   -CN       User Key  (r) = Recorded By, (t) = Taken By, (c) = Cosigned By    Initials Name Provider Type    Dorina Brown, PT Physical Therapist            OP Exercises     Row Name 19 1600             Subjective Comments    Subjective Comments  It was bad last night and I t hink it had to do with sitting at work.   -CN         Subjective Pain    Able to rate subjective pain?  yes  -CN      Pre-Treatment Pain Level  4  -CN         Total Minutes    76161 - PT Therapeutic Exercise Minutes  25  -CN      99326 - PT  Manual Therapy Minutes  15  -CN         Exercise 1    Exercise Name 1  chin tucks supine  -CN      Reps 1  10  -CN      Time 1  5  -CN         Exercise 2    Exercise Name 2  HA over noodle  -CN      Reps 2  10  -CN      Additional Comments  RTB  -CN         Exercise 3    Exercise Name 3  alternating shoulder flx over noodle  -CN      Cueing 3  Demo  -CN      Reps 3  10 B  -CN      Time 3  5 sec  -CN         Exercise 8    Exercise Name 8  B UE ER over noodle  -CN      Cueing 8  Demo  -CN      Reps 8  10  -CN      Additional Comments  RTB  -CN         Exercise 9    Exercise Name 9  Doorway pec stretch  -CN      Cueing 9  Demo  -CN      Reps 9  3  -CN      Time 9  20 sec  -CN      Additional Comments  single arm  -CN        User Key  (r) = Recorded By, (t) = Taken By, (c) = Cosigned By    Initials Name Provider Type    Dorina Brown, PT Physical Therapist                      Manual Rx (last 36 hours)      Manual Treatments     Row Name 11/06/19 1600             Total Minutes    60026 - PT Manual Therapy Minutes  15  -CN         Manual Rx 1    Manual Rx 1 Location  neck  -CN      Manual Rx 1 Type  cerv dx, upglide lower cervical B   -CN      Manual Rx 1 Grade  III  -CN         Manual Rx 2    Manual Rx 2 Location  neck  -CN      Manual Rx 2 Type  STM suboccipitals, levator, UT B  -CN      Manual Rx 2 Grade  moderate pressure  -CN        User Key  (r) = Recorded By, (t) = Taken By, (c) = Cosigned By    Initials Name Provider Type    Dorina Brown, PT Physical Therapist          PT OP Goals     Row Name 11/06/19 1600          PT Short Term Goals    STG Date to Achieve  10/28/19  -CN     STG 1  Pt will report pain rated 2/10 at worst in order to demonstrate ability to return to normalized ADLs and functional activities.  -CN     STG 1 Progress  Ongoing  -CN     STG 2  Pt will be independent with initial HEP for symptom management.  -CN     STG 2 Progress  Progressing  -CN     STG 2 Progress  Comments  Updated HEP handouts.   -CN        Long Term Goals    LTG Date to Achieve  11/18/19  -CN     LTG 1  Pt will be independent and compliant with advanced HEP for long term management of symptoms and prevention of future occurrence.   -CN     LTG 1 Progress  Ongoing  -CN     LTG 2  Pt will reduce level of perceived disability as measured by the NDI  to 8% in order to improve QOL.  -CN     LTG 2 Progress  Ongoing  -CN     LTG 3  Pt will report 50% improvement in radicular symptoms to R UE.   -CN     LTG 3 Progress  Ongoing  -CN       User Key  (r) = Recorded By, (t) = Taken By, (c) = Cosigned By    Initials Name Provider Type    Dorina Brown, PT Physical Therapist          Therapy Education  Given: HEP, Symptoms/condition management, Pain management, Posture/body mechanics  Program: Reinforced  How Provided: Verbal, Demonstration, Written  Provided to: Patient  Level of Understanding: Teach back education performed, Verbalized, Demonstrated              Time Calculation:   Start Time: 1645  Stop Time: 1730  Time Calculation (min): 45 min  Therapy Charges for Today     Code Description Service Date Service Provider Modifiers Qty    12776899181  PT THER PROC EA 15 MIN 11/6/2019 Dorina Cintron, PT GP 2    [id number]  PT MANUAL THERAPY EA 15 MIN 11/6/2019 Dorina Cintron, PT GP 1                    Dorina Cintron, KAYLAN  11/6/2019

## 2019-11-08 ENCOUNTER — HOSPITAL ENCOUNTER (OUTPATIENT)
Dept: PHYSICAL THERAPY | Facility: HOSPITAL | Age: 49
Setting detail: THERAPIES SERIES
Discharge: HOME | End: 2019-11-08

## 2019-11-13 ENCOUNTER — HOSPITAL ENCOUNTER (OUTPATIENT)
Dept: PHYSICAL THERAPY | Facility: HOSPITAL | Age: 49
Setting detail: THERAPIES SERIES
Discharge: HOME | End: 2019-11-13

## 2019-11-13 DIAGNOSIS — R29.3 POOR POSTURE: ICD-10-CM

## 2019-11-13 DIAGNOSIS — M54.2 CERVICAL PAIN: Primary | ICD-10-CM

## 2019-11-13 PROCEDURE — 97140 MANUAL THERAPY 1/> REGIONS: CPT

## 2019-11-13 PROCEDURE — 97110 THERAPEUTIC EXERCISES: CPT

## 2019-11-13 PROCEDURE — 97012 MECHANICAL TRACTION THERAPY: CPT

## 2019-11-13 NOTE — THERAPY TREATMENT NOTE
Outpatient Physical Therapy Ortho Treatment Note  [facility]     Patient Name: Viktoria Mukherjee  : 1970  MRN: [id number]  Today's Date: 2019      Visit Date: 2019    Visit Dx:    ICD-10-CM ICD-9-CM   1. Cervical pain M54.2 723.1   2. Poor posture R29.3 781.92       Patient Active Problem List   Diagnosis   • Hypothyroidism, adult   • Allergic rhinitis   • Asthma   • Tic disorder   • Idiopathic hypersomnia with long sleep time   • Chronic fatigue   • Sleep concern   • Bruxism        Past Medical History:   Diagnosis Date   • Allergic rhinitis    • Asthma    • Disease of thyroid gland    • Tic disorder         Past Surgical History:   Procedure Laterality Date   •  SECTION  2006   • DIAGNOSTIC LAPAROSCOPY                         PT Assessment/Plan     Row Name 19 1725          PT Assessment    Assessment Comments  Pt reports minimal today and only mild soreness after last visit. Pt with many questions regarding posture and correct mechanics with exercises and spent time reviewing muscle activation with current program.   -CN        PT Plan    PT Plan Comments  Continue to progress postural strengthening and consider prone Ys and Ts if painfree next vist. Update HEP and administer daily vs 3x/week exercises.   -CN       User Key  (r) = Recorded By, (t) = Taken By, (c) = Cosigned By    Initials Name Provider Type    Dorina Brown, PT Physical Therapist          Modalities     Row Name 19 1600             Moist Heat    MH Applied  Yes  -CN      Location  cervical spine with mechanical traction  -CN      Rx Minutes  10 mins  -CN         Traction 30141    Traction Type  Cervical  -CN      Rx Minutes  10  -CN      Duration  Intermittent  -CN      Position  Hook-lying  -CN      Weight  18 /8  -CN      Hold  40  -CN      Relax  10  -CN        User Key  (r) = Recorded By, (t) = Taken By, (c) = Cosigned By    Initials Name Provider Type    Dorina Brown  Nancy, PT Physical Therapist        OP Exercises     Row Name 11/13/19 1700 11/13/19 1600          Subjective Comments    Subjective Comments  --  It feels pretty good. I had a little bit of soreness after last time, but no increased pain.   -CN        Subjective Pain    Able to rate subjective pain?  --  yes  -CN     Pre-Treatment Pain Level  --  1  -CN        Total Minutes    28097 - PT Therapeutic Exercise Minutes  --  10  -CN     51668 - PT Manual Therapy Minutes  20  -CN  --        Exercise 7    Exercise Name 7  --  openbooks  -CN     Reps 7  --  10B  -CN        Exercise 8    Exercise Name 8  --  Seated B UE ER  -CN     Cueing 8  --  Demo  -CN     Reps 8  --  10  -CN     Additional Comments  --  RTB, cues for erect posture and head positioning  -CN        Exercise 10    Exercise Name 10  --  Lat pulls  -CN     Cueing 10  --  Demo  -CN     Reps 10  --  10  -CN     Additional Comments  --  RTB  -CN       User Key  (r) = Recorded By, (t) = Taken By, (c) = Cosigned By    Initials Name Provider Type    Dorina Brown, PT Physical Therapist                      Manual Rx (last 36 hours)      Manual Treatments     Row Name 11/13/19 1700             Total Minutes    07919 - PT Manual Therapy Minutes  20  -CN         Manual Rx 1    Manual Rx 1 Location  neck  -CN      Manual Rx 1 Type  cerv dx, upglide lower cervical B   -CN      Manual Rx 1 Grade  III  -CN         Manual Rx 2    Manual Rx 2 Location  neck  -CN      Manual Rx 2 Type  STM suboccipitals, levator, UT B  -CN      Manual Rx 2 Grade  moderate pressure  -CN        User Key  (r) = Recorded By, (t) = Taken By, (c) = Cosigned By    Initials Name Provider Type    Dorina Brown, PT Physical Therapist          PT OP Goals     Row Name 11/13/19 1700          PT Short Term Goals    STG Date to Achieve  10/28/19  -CN     STG 1  Pt will report pain rated 2/10 at worst in order to demonstrate ability to return to normalized ADLs and functional  activities.  -CN     STG 1 Progress  Progressing  -CN     STG 1 Progress Comments  Pt reports pain rated 1/10 today.   -CN     STG 2  Pt will be independent with initial HEP for symptom management.  -CN     STG 2 Progress  Progressing  -CN        Long Term Goals    LTG Date to Achieve  11/18/19  -CN     LTG 1  Pt will be independent and compliant with advanced HEP for long term management of symptoms and prevention of future occurrence.   -CN     LTG 1 Progress  Ongoing  -CN     LTG 2  Pt will reduce level of perceived disability as measured by the NDI  to 8% in order to improve QOL.  -CN     LTG 2 Progress  Ongoing  -CN     LTG 3  Pt will report 50% improvement in radicular symptoms to R UE.   -CN     LTG 3 Progress  Ongoing  -CN       User Key  (r) = Recorded By, (t) = Taken By, (c) = Cosigned By    Initials Name Provider Type    Dorina Brown, PT Physical Therapist          Therapy Education  Given: HEP, Symptoms/condition management, Pain management, Posture/body mechanics  Program: Reinforced  How Provided: Verbal, Demonstration  Provided to: Patient  Level of Understanding: Teach back education performed, Verbalized, Demonstrated              Time Calculation:   Start Time: 1645  Stop Time: 1725  Time Calculation (min): 40 min  Therapy Charges for Today     Code Description Service Date Service Provider Modifiers Qty    29707398200 HC PT THER PROC EA 15 MIN 11/13/2019 Dorina Cintron, PT GP 1    60749269423 HC PT MANUAL THERAPY EA 15 MIN 11/13/2019 Dorina Cintron, PT 59, GP 1    89047171134 HC PT HOT OR COLD PACK TREAT MCARE 11/13/2019 Dorina Cintron, PT GP 1    81402478523 HC PT-TRACTION MECHANICAL 11/13/2019 Dorina Cintron, PT 59 1                    Dorina Cintron, PT  11/13/2019

## 2019-11-15 ENCOUNTER — HOSPITAL ENCOUNTER (OUTPATIENT)
Dept: PHYSICAL THERAPY | Facility: HOSPITAL | Age: 49
Setting detail: THERAPIES SERIES
Discharge: HOME | End: 2019-11-15

## 2019-11-15 DIAGNOSIS — R29.3 POOR POSTURE: ICD-10-CM

## 2019-11-15 DIAGNOSIS — M54.2 CERVICAL PAIN: Primary | ICD-10-CM

## 2019-11-15 PROCEDURE — 97140 MANUAL THERAPY 1/> REGIONS: CPT

## 2019-11-15 NOTE — THERAPY TREATMENT NOTE
Outpatient Physical Therapy Ortho Treatment Note  [facility]     Patient Name: Viktoria Mukherjee  : 1970  MRN: [id number]  Today's Date: 11/15/2019      Visit Date: 11/15/2019    Visit Dx:    ICD-10-CM ICD-9-CM   1. Cervical pain M54.2 723.1   2. Poor posture R29.3 781.92       Patient Active Problem List   Diagnosis   • Hypothyroidism, adult   • Allergic rhinitis   • Asthma   • Tic disorder   • Idiopathic hypersomnia with long sleep time   • Chronic fatigue   • Sleep concern   • Bruxism        Past Medical History:   Diagnosis Date   • Allergic rhinitis    • Asthma    • Disease of thyroid gland    • Tic disorder         Past Surgical History:   Procedure Laterality Date   •  SECTION  2006   • DIAGNOSTIC LAPAROSCOPY                   Vestibular Eval     Row Name 11/15/19 7275             Symptom Behavior    Type of Dizziness  Spinning  -      Frequency of Dizziness  Several Times a Day  -      Duration of Dizziness  Seconds  -      Aggravating Factors  Supine to sit;Turning head quickly  -      Relieving Factors  Slow movements  -         Occulomotor Exam Fixation Present    Saccades  Intact  -         Positional Testing    Vertebrobasilar Artery Screen - Right  Negative  -      Vertebrobasilar Artery Screen - Left  Negative  -      [place]-Hallpike Right  Upbeat, left rotatory nystagmus  -      [place]-Hallpike Left  No nystagmus  -        User Key  (r) = Recorded By, (t) = Taken By, (c) = Cosigned By    Initials Name Provider Type    Skye Anglin PT Physical Therapist            PT Assessment/Plan     Row Name 11/15/19 6281          PT Assessment    Assessment Comments  Pt presents today w/ reports of improved neck pain though has new complaint of vertigo-like symptoms limiting her ability to participate in exercises. Pt was screened for BPPV using esther-hallpike testing and found to have positive response on R for posterior canal BPPV. Pt was treated with epley's  maneuver and re-tested demonstrating negative response to testing following treatment. Exercises held this date due to pt residual mild dizziness after treatment.    -        PT Plan    PT Plan Comments  reassess esther-hallpike next session to ensure BPPV cleared, cont to progress original POC for postural strength and thoracic mobility.   -       User Key  (r) = Recorded By, (t) = Taken By, (c) = Cosigned By    Initials Name Provider Type    Skye Anglin PT Physical Therapist            OP Exercises     Row Name 11/15/19 1700             Subjective Comments    Subjective Comments  Pt presents today reporting she has been feeling better overall, but within last hour of work noticed that pain starting increasing. Was using a different computer than normal today and thinks the set up may have attributed to this. Pt has additional complaint of vertigo like symptoms this date stating she has had to move very slowly all day and feels like the room is spinning if she moves from supine to sit. This just started a couple days ago but she has been unable to perform some of her exercises because of this symptom.  -         Total Minutes    24533 - PT Manual Therapy Minutes  30  -        User Key  (r) = Recorded By, (t) = Taken By, (c) = Cosigned By    Initials Name Provider Type    Skye Anglin PT Physical Therapist                      Manual Rx (last 36 hours)      Manual Treatments     Row Name 11/15/19 1700             Total Minutes    76406 - PT Manual Therapy Minutes  30  -JH         Manual Rx 1    Manual Rx 1 Location  neck  -JH      Manual Rx 1 Type  cerv dx, upglide lower cervical B   -JH      Manual Rx 1 Grade  III  -JH         Manual Rx 2    Manual Rx 2 Location  neck  -JH      Manual Rx 2 Type  STM suboccipitals, levator, UT B  -JH      Manual Rx 2 Grade  moderate pressure  -JH         Manual Rx 3    Manual Rx 3 Location  epley's manuever to R  -        User Key  (r) = Recorded By, (t) =  Taken By, (c) = Cosigned By    Initials Name Provider Type    Skye Anglin, PT Physical Therapist          PT OP Goals     Row Name 11/15/19 1700          PT Short Term Goals    STG Date to Achieve  10/28/19  -     STG 1  Pt will report pain rated 2/10 at worst in order to demonstrate ability to return to normalized ADLs and functional activities.  -     STG 1 Progress  Progressing  -     STG 2  Pt will be independent with initial HEP for symptom management.  -     STG 2 Progress  Progressing  -        Long Term Goals    LTG Date to Achieve  11/18/19  -     LTG 1  Pt will be independent and compliant with advanced HEP for long term management of symptoms and prevention of future occurrence.   -     LTG 1 Progress  Ongoing  -     LTG 2  Pt will reduce level of perceived disability as measured by the NDI  to 8% in order to improve QOL.  -     LTG 2 Progress  Ongoing  -     LTG 3  Pt will report 50% improvement in radicular symptoms to R UE.   -     LTG 3 Progress  Ongoing  -       User Key  (r) = Recorded By, (t) = Taken By, (c) = Cosigned By    Initials Name Provider Type    Skye Anglin, KAYLAN Physical Therapist          Therapy Education  Education Details: education on nature of condition for BPPV, monitoring symptoms this evening and holding on exercise until to tomorrow  Given: HEP, Symptoms/condition management, Posture/body mechanics, Pain management, Mobility training  Program: New, Reinforced  How Provided: Verbal, Demonstration  Provided to: Patient  Level of Understanding: Teach back education performed, Verbalized, Demonstrated              Time Calculation:   Start Time: 1630  Stop Time: 1700  Time Calculation (min): 30 min  Therapy Charges for Today     Code Description Service Date Service Provider Modifiers Qty    96761521523 HC PT MANUAL THERAPY EA 15 MIN 11/15/2019 Skye Carmen, PT GP 2                    Skye Carmen PT  11/15/2019

## 2019-12-04 ENCOUNTER — HOSPITAL ENCOUNTER (OUTPATIENT)
Dept: PHYSICAL THERAPY | Facility: HOSPITAL | Age: 49
Setting detail: THERAPIES SERIES
Discharge: HOME | End: 2019-12-04

## 2019-12-04 DIAGNOSIS — R29.3 POOR POSTURE: ICD-10-CM

## 2019-12-04 DIAGNOSIS — M54.2 CERVICAL PAIN: Primary | ICD-10-CM

## 2019-12-04 PROCEDURE — 97012 MECHANICAL TRACTION THERAPY: CPT

## 2019-12-04 PROCEDURE — 97140 MANUAL THERAPY 1/> REGIONS: CPT

## 2019-12-05 NOTE — THERAPY TREATMENT NOTE
Outpatient Physical Therapy Ortho Treatment Note  [facility]     Patient Name: Viktoria Mukherjee  : 1970  MRN: [id number]  Today's Date: 2019      Visit Date: 2019    Visit Dx:    ICD-10-CM ICD-9-CM   1. Cervical pain M54.2 723.1   2. Poor posture R29.3 781.92       Patient Active Problem List   Diagnosis   • Hypothyroidism, adult   • Allergic rhinitis   • Asthma   • Tic disorder   • Idiopathic hypersomnia with long sleep time   • Chronic fatigue   • Sleep concern   • Bruxism        Past Medical History:   Diagnosis Date   • Allergic rhinitis    • Asthma    • Disease of thyroid gland    • Tic disorder         Past Surgical History:   Procedure Laterality Date   •  SECTION  2006   • DIAGNOSTIC LAPAROSCOPY                         PT Assessment/Plan     Row Name 19 1700          PT Assessment    Assessment Comments  Pt continues with dizziness/symptoms of vertigo and held on exercise program due to symptoms. Discussed possibly obtaining referral for PT to treat vertigo specifically and pt states that she will call her PCP for referral.   -CN        PT Plan    PT Plan Comments  Return to strengthening per pt's tolerance and possibly schedule evaluation for vertigo.   -CN       User Key  (r) = Recorded By, (t) = Taken By, (c) = Cosigned By    Initials Name Provider Type    Dorina Brown, PT Physical Therapist          Modalities     Row Name 19 1700             Subjective Comments    Subjective Comments  I am still having some dizziness, although it was better after last time.   -CN         Subjective Pain    Able to rate subjective pain?  yes  -CN         Moist Heat    MH Applied  Yes  -CN      Location  cervical spine with mechanical traction  -CN      Rx Minutes  10 mins  -CN         Traction 38571    Traction Type  Cervical  -CN      Rx Minutes  10  -CN      Duration  Intermittent  -CN      Position  Hook-lying  -CN      Weight  18 /8  -CN      Hold  40   -CN      Relax  10  -CN        User Key  (r) = Recorded By, (t) = Taken By, (c) = Cosigned By    Initials Name Provider Type    Dorina Brown, PT Physical Therapist        OP Exercises     Row Name 12/05/19 1100 12/04/19 1700          Subjective Comments    Subjective Comments  --  I am still having some dizziness, although it was better after last time.   -CN        Subjective Pain    Able to rate subjective pain?  --  yes  -CN        Total Minutes    03508 - PT Manual Therapy Minutes    -CN  28       User Key  (r) = Recorded By, (t) = Taken By, (c) = Cosigned By    Initials Name Provider Type    Dorina Brown, PT Physical Therapist                      Manual Rx (last 36 hours)      Manual Treatments     Row Name 12/04/19 1700             Total Minutes    81405 - PT Manual Therapy Minutes  28  -CN         Manual Rx 1    Manual Rx 1 Location  neck  -CN      Manual Rx 1 Type  cerv pglide lower cervical B   -CN      Manual Rx 1 Grade  III  -CN         Manual Rx 2    Manual Rx 2 Location  neck  -CN      Manual Rx 2 Type  STM suboccipitals, levator, UT B  -CN      Manual Rx 2 Grade  moderate pressure  -CN        User Key  (r) = Recorded By, (t) = Taken By, (c) = Cosigned By    Initials Name Provider Type    Dorina Brown, PT Physical Therapist          PT OP Goals     Row Name 12/04/19 1700          PT Short Term Goals    STG Date to Achieve  10/28/19  -CN     STG 1  Pt will report pain rated 2/10 at worst in order to demonstrate ability to return to normalized ADLs and functional activities.  -CN     STG 1 Progress  Progressing  -CN     STG 2  Pt will be independent with initial HEP for symptom management.  -CN     STG 2 Progress  Progressing  -CN     STG 2 Progress Comments  Pt reports intermittent compliance with HEP.   -CN        Long Term Goals    LTG Date to Achieve  11/18/19  -CN     LTG 1  Pt will be independent and compliant with advanced HEP for long term management of  symptoms and prevention of future occurrence.   -CN     LTG 1 Progress  Ongoing  -CN     LTG 2  Pt will reduce level of perceived disability as measured by the NDI  to 8% in order to improve QOL.  -CN     LTG 2 Progress  Ongoing  -CN     LTG 3  Pt will report 50% improvement in radicular symptoms to R UE.   -CN     LTG 3 Progress  Ongoing  -CN       User Key  (r) = Recorded By, (t) = Taken By, (c) = Cosigned By    Initials Name Provider Type    Dorina Brown, PT Physical Therapist          Therapy Education  Given: HEP, Symptoms/condition management, Posture/body mechanics, Pain management, Mobility training  Program: Reinforced  How Provided: Verbal, Demonstration  Provided to: Patient  Level of Understanding: Teach back education performed, Verbalized, Demonstrated              Time Calculation:   Start Time: 1645  Stop Time: 1723  Time Calculation (min): 38 min  Therapy Charges for Today     Code Description Service Date Service Provider Modifiers Qty    09382121215 HC PT MANUAL THERAPY EA 15 MIN 12/4/2019 Dorina Cintron, PT 59, GP 2    75864020014 HC PT HOT OR COLD PACK TREAT MCARE 12/4/2019 Dorina Cintron, PT GP 1    18691052137 HC PT-TRACTION MECHANICAL 12/4/2019 Dorina Cintron, PT 59 1                    Dorina Cintron PT  12/7/2019

## 2019-12-11 ENCOUNTER — HOSPITAL ENCOUNTER (OUTPATIENT)
Dept: PHYSICAL THERAPY | Facility: HOSPITAL | Age: 49
Setting detail: THERAPIES SERIES
Discharge: HOME | End: 2019-12-11

## 2019-12-11 DIAGNOSIS — R29.3 POOR POSTURE: ICD-10-CM

## 2019-12-11 DIAGNOSIS — M54.2 CERVICAL PAIN: Primary | ICD-10-CM

## 2019-12-11 PROCEDURE — 97012 MECHANICAL TRACTION THERAPY: CPT

## 2019-12-11 PROCEDURE — 97140 MANUAL THERAPY 1/> REGIONS: CPT

## 2019-12-16 ENCOUNTER — APPOINTMENT (OUTPATIENT)
Dept: PHYSICAL THERAPY | Facility: HOSPITAL | Age: 49
End: 2019-12-16

## 2019-12-18 ENCOUNTER — APPOINTMENT (OUTPATIENT)
Dept: PHYSICAL THERAPY | Facility: HOSPITAL | Age: 49
End: 2019-12-18

## 2020-03-07 DIAGNOSIS — J30.9 ALLERGIC RHINITIS: ICD-10-CM

## 2020-03-09 RX ORDER — GUANFACINE 2 MG/1
TABLET ORAL
Qty: 60 TABLET | Refills: 4 | Status: SHIPPED | OUTPATIENT
Start: 2020-03-09 | End: 2021-02-01

## 2020-03-09 RX ORDER — MONTELUKAST SODIUM 10 MG/1
TABLET ORAL
Qty: 30 TABLET | Refills: 4 | Status: SHIPPED | OUTPATIENT
Start: 2020-03-09 | End: 2020-08-17

## 2020-04-21 ENCOUNTER — APPOINTMENT (OUTPATIENT)
Dept: SLEEP MEDICINE | Facility: HOSPITAL | Age: 50
End: 2020-04-21

## 2020-07-21 ENCOUNTER — OFFICE VISIT (OUTPATIENT)
Dept: SLEEP MEDICINE | Facility: HOSPITAL | Age: 50
End: 2020-07-21

## 2020-07-21 VITALS
HEART RATE: 81 BPM | SYSTOLIC BLOOD PRESSURE: 139 MMHG | WEIGHT: 157 LBS | OXYGEN SATURATION: 98 % | BODY MASS INDEX: 30.82 KG/M2 | DIASTOLIC BLOOD PRESSURE: 71 MMHG | HEIGHT: 60 IN

## 2020-07-21 DIAGNOSIS — G47.11 IDIOPATHIC HYPERSOMNIA WITH LONG SLEEP TIME: ICD-10-CM

## 2020-07-21 PROCEDURE — G0463 HOSPITAL OUTPT CLINIC VISIT: HCPCS

## 2020-07-21 RX ORDER — MODAFINIL 200 MG/1
200 TABLET ORAL DAILY
Qty: 30 TABLET | Refills: 5 | Status: SHIPPED | OUTPATIENT
Start: 2020-07-21 | End: 2021-08-10 | Stop reason: SDUPTHER

## 2020-07-21 NOTE — PROGRESS NOTES
"  Sleep Medicine follow up  Viktoria Mukherjee  : 1970  49 y.o. female   Date of Service: 2020  Referring provider: Rell Bowden MD    History Of Present Illness:   Mrs. Viktoria Mukherjee  is a 49 y.o. patient with history of asthma and hypothyroidism and did tic disorder and has Mild tremors of the jaw and neck and was told that she has tics and tremors at [facility].  The patient reports that they have placed on guaifenesin which benefits her jaw tremors and tics.     Patient is here for a follow-up visit regarding Excessive Daytime Sleepiness and Chronic Fatigue.    The patient overnight polysomnography was normal with no sleep apnea or periodic limb movement disorder and angina she slept for 394 minutes and subsequent multiple sleep latency testing revealing mean sleep latency of 2.8 minutes but no sleep onset REM.    She feels energetic and functional and 200 mg of Provigil caused her to have palpitations and hence she has cut down the dose to 1/4 th tablet twice a day (50 mg twice a day).    Sleep schedule: Bedtime:9-11 p.m, gets out of bed at 6:30 AM, sleep latency: 30 minutes, Gets about 7-9 hours of sleep.  On weekends she may take naps in the afternoon.    Interval history:2020 The patient returns for follow-up visit and feels that Provigil has been beneficial taken 100 mg (1/2 of 200 mg tablets)twice a day and no complaints of insomnia or any other side effects.  She can function quite well for most of the day.  [place] Sleepiness Scale score: 12/24 today.    Review of Systems   HENT: Positive for postnasal drip.    Musculoskeletal: Positive for arthralgias and myalgias.   Psychiatric/Behavioral: Positive for dysphoric mood.   All other systems reviewed and are negative.    Patient examination:  Vitals:    20 0909   BP: 139/71   Pulse: 81   SpO2: 98%   Weight: 71.2 kg (157 lb)   Height: 152.4 cm (60\")   BMI 28.34  HEENT: Normal and Mallampati " Class II: Soft palate, fauces, uvula visible   Neck: Supple no carotid bruits.  Lungs: Clear to auscultation.  Cardiac exam: Normal and regular rate and rhythm. No murmurs.  Extremities: No edema clubbing or cyanosis.    Date 11/21/2017 3/120/2018 9/18/18 4/23/19 7/21/20      [place] 15 12 6  7 12        MRI Brain in the past was normal.   2/16/2018: NPSG followed by MSLT: Overnight polysomnography performed the night prior to the MSLT study revealed no evidence for obstructive sleep apnea syndrome or PLMD.  The patient slept for 394 minutes with sleep onset latency of 9.6 minutes and latency to REM sleep from sleep onset 272 minutes.  Urine drug screen was negative.     The MSLT study revealed a mean sleep latency of 2.8 minutes and 0 Sleep Onset REM (SOREM’s) were seen.     ASSESSMENT AND PLAN:The patient has Idiopathic hypersomnia.  The patient is greatly benefiting from Provigil. We will treat the patient with Provigil 100 mg twice a day as a single dose of 200 mg dose causes her to have palpitations when she takes the modafinil 100 mg at 6 AM and 100 mg at 12 noon.    Sleep hygiene techniques discussed.  Exercise diet and weight loss discussed.      Encounter Diagnosis   Name Primary?   • Idiopathic hypersomnia with long sleep time      Return in about 1 year (around 7/21/2021).    Rell Bowden MD  7/21/2020  09:35

## 2020-08-15 DIAGNOSIS — J30.9 ALLERGIC RHINITIS: ICD-10-CM

## 2020-08-17 RX ORDER — MONTELUKAST SODIUM 10 MG/1
TABLET ORAL
Qty: 30 TABLET | Refills: 3 | Status: SHIPPED | OUTPATIENT
Start: 2020-08-17 | End: 2020-12-21

## 2020-08-18 ENCOUNTER — APPOINTMENT (OUTPATIENT)
Dept: WOMENS IMAGING | Facility: HOSPITAL | Age: 50
End: 2020-08-18

## 2020-08-18 PROCEDURE — 77067 SCR MAMMO BI INCL CAD: CPT | Performed by: RADIOLOGY

## 2020-08-18 PROCEDURE — 77063 BREAST TOMOSYNTHESIS BI: CPT | Performed by: RADIOLOGY

## 2020-10-08 ENCOUNTER — OFFICE VISIT (OUTPATIENT)
Dept: INTERNAL MEDICINE | Facility: CLINIC | Age: 50
End: 2020-10-08

## 2020-10-08 VITALS
TEMPERATURE: 98.5 F | OXYGEN SATURATION: 98 % | HEART RATE: 67 BPM | WEIGHT: 160 LBS | BODY MASS INDEX: 31.41 KG/M2 | HEIGHT: 60 IN | SYSTOLIC BLOOD PRESSURE: 110 MMHG | DIASTOLIC BLOOD PRESSURE: 80 MMHG

## 2020-10-08 DIAGNOSIS — Z11.59 SCREENING FOR VIRAL DISEASE: ICD-10-CM

## 2020-10-08 DIAGNOSIS — Z00.00 ANNUAL PHYSICAL EXAM: Primary | ICD-10-CM

## 2020-10-08 PROCEDURE — 99396 PREV VISIT EST AGE 40-64: CPT | Performed by: INTERNAL MEDICINE

## 2020-10-08 RX ORDER — ARIPIPRAZOLE 5 MG/1
TABLET ORAL
COMMUNITY
Start: 2020-09-18 | End: 2021-08-09

## 2020-10-09 LAB
ALBUMIN SERPL-MCNC: 4.6 G/DL (ref 3.5–5.2)
ALBUMIN/GLOB SERPL: 2.6 G/DL
ALP SERPL-CCNC: 51 U/L (ref 39–117)
ALT SERPL-CCNC: 15 U/L (ref 1–33)
APPEARANCE UR: CLEAR
AST SERPL-CCNC: 16 U/L (ref 1–32)
BACTERIA #/AREA URNS HPF: NORMAL /HPF
BILIRUB SERPL-MCNC: 0.5 MG/DL (ref 0–1.2)
BILIRUB UR QL STRIP: NEGATIVE
BUN SERPL-MCNC: 10 MG/DL (ref 6–20)
BUN/CREAT SERPL: 11.6 (ref 7–25)
CALCIUM SERPL-MCNC: 9.3 MG/DL (ref 8.6–10.5)
CHLORIDE SERPL-SCNC: 103 MMOL/L (ref 98–107)
CHOLEST SERPL-MCNC: 194 MG/DL (ref 0–200)
CHOLEST/HDLC SERPL: 3.18 {RATIO}
CO2 SERPL-SCNC: 26.1 MMOL/L (ref 22–29)
COLOR UR: YELLOW
CREAT SERPL-MCNC: 0.86 MG/DL (ref 0.57–1)
EPI CELLS #/AREA URNS HPF: NORMAL /HPF (ref 0–10)
ERYTHROCYTE [DISTWIDTH] IN BLOOD BY AUTOMATED COUNT: 11.7 % (ref 12.3–15.4)
GLOBULIN SER CALC-MCNC: 1.8 GM/DL
GLUCOSE SERPL-MCNC: 99 MG/DL (ref 65–99)
GLUCOSE UR QL: NEGATIVE
HCT VFR BLD AUTO: 40 % (ref 34–46.6)
HCV AB S/CO SERPL IA: <0.1 S/CO RATIO (ref 0–0.9)
HDLC SERPL-MCNC: 61 MG/DL (ref 40–60)
HGB BLD-MCNC: 14.1 G/DL (ref 12–15.9)
HGB UR QL STRIP: NEGATIVE
KETONES UR QL STRIP: NEGATIVE
LDLC SERPL CALC-MCNC: 111 MG/DL (ref 0–100)
LEUKOCYTE ESTERASE UR QL STRIP: NEGATIVE
MCH RBC QN AUTO: 33.7 PG (ref 26.6–33)
MCHC RBC AUTO-ENTMCNC: 35.3 G/DL (ref 31.5–35.7)
MCV RBC AUTO: 95.5 FL (ref 79–97)
MICRO URNS: NORMAL
MICRO URNS: NORMAL
MUCOUS THREADS URNS QL MICRO: PRESENT /HPF
NITRITE UR QL STRIP: NEGATIVE
PH UR STRIP: 7.5 [PH] (ref 5–7.5)
PLATELET # BLD AUTO: 228 10*3/MM3 (ref 140–450)
POTASSIUM SERPL-SCNC: 4.2 MMOL/L (ref 3.5–5.2)
PROT SERPL-MCNC: 6.4 G/DL (ref 6–8.5)
PROT UR QL STRIP: NEGATIVE
RBC # BLD AUTO: 4.19 10*6/MM3 (ref 3.77–5.28)
RBC #/AREA URNS HPF: NORMAL /HPF (ref 0–2)
SODIUM SERPL-SCNC: 138 MMOL/L (ref 136–145)
SP GR UR: 1.01 (ref 1–1.03)
T4 FREE SERPL-MCNC: 1.25 NG/DL (ref 0.93–1.7)
TRIGL SERPL-MCNC: 123 MG/DL (ref 0–150)
TSH SERPL DL<=0.005 MIU/L-ACNC: 4.63 UIU/ML (ref 0.27–4.2)
URINALYSIS REFLEX: NORMAL
UROBILINOGEN UR STRIP-MCNC: 0.2 MG/DL (ref 0.2–1)
VLDLC SERPL CALC-MCNC: 22 MG/DL (ref 5–40)
WBC # BLD AUTO: 5.63 10*3/MM3 (ref 3.4–10.8)
WBC #/AREA URNS HPF: NORMAL /HPF (ref 0–5)

## 2020-12-19 DIAGNOSIS — J30.9 ALLERGIC RHINITIS: ICD-10-CM

## 2020-12-21 RX ORDER — MONTELUKAST SODIUM 10 MG/1
TABLET ORAL
Qty: 30 TABLET | Refills: 2 | Status: SHIPPED | OUTPATIENT
Start: 2020-12-21 | End: 2021-02-01

## 2021-01-31 DIAGNOSIS — J30.9 ALLERGIC RHINITIS: ICD-10-CM

## 2021-02-01 RX ORDER — MONTELUKAST SODIUM 10 MG/1
TABLET ORAL
Qty: 30 TABLET | Refills: 5 | Status: SHIPPED | OUTPATIENT
Start: 2021-02-01 | End: 2021-04-01

## 2021-02-01 RX ORDER — GUANFACINE 2 MG/1
TABLET ORAL
Qty: 60 TABLET | Refills: 5 | Status: SHIPPED | OUTPATIENT
Start: 2021-02-01 | End: 2022-02-15 | Stop reason: SDUPTHER

## 2021-04-01 DIAGNOSIS — J30.9 ALLERGIC RHINITIS: ICD-10-CM

## 2021-04-01 RX ORDER — MONTELUKAST SODIUM 10 MG/1
TABLET ORAL
Qty: 30 TABLET | Refills: 8 | Status: SHIPPED | OUTPATIENT
Start: 2021-04-01 | End: 2021-11-01

## 2021-04-02 ENCOUNTER — BULK ORDERING (OUTPATIENT)
Dept: CASE MANAGEMENT | Facility: OTHER | Age: 51
End: 2021-04-02

## 2021-04-02 DIAGNOSIS — Z23 IMMUNIZATION DUE: ICD-10-CM

## 2021-08-09 ENCOUNTER — OFFICE VISIT (OUTPATIENT)
Dept: INTERNAL MEDICINE | Facility: CLINIC | Age: 51
End: 2021-08-09

## 2021-08-09 VITALS
DIASTOLIC BLOOD PRESSURE: 62 MMHG | BODY MASS INDEX: 30.94 KG/M2 | RESPIRATION RATE: 16 BRPM | HEIGHT: 60 IN | TEMPERATURE: 96.9 F | SYSTOLIC BLOOD PRESSURE: 108 MMHG | WEIGHT: 157.6 LBS

## 2021-08-09 DIAGNOSIS — R30.9 PAIN WITH URINATION: Primary | ICD-10-CM

## 2021-08-09 LAB
BACTERIA UR QL AUTO: ABNORMAL /HPF
BILIRUB UR QL STRIP: NEGATIVE
CLARITY UR: CLEAR
COLOR UR: YELLOW
GLUCOSE UR STRIP-MCNC: NEGATIVE MG/DL
HGB UR QL STRIP.AUTO: ABNORMAL
HYALINE CASTS UR QL AUTO: ABNORMAL /LPF
KETONES UR QL STRIP: NEGATIVE
LEUKOCYTE ESTERASE UR QL STRIP.AUTO: NEGATIVE
NITRITE UR QL STRIP: NEGATIVE
PH UR STRIP.AUTO: 6 [PH] (ref 5–8)
PROT UR QL STRIP: NEGATIVE
RBC # UR: ABNORMAL /HPF
REF LAB TEST METHOD: ABNORMAL
SP GR UR STRIP: <=1.005 (ref 1–1.03)
SQUAMOUS #/AREA URNS HPF: ABNORMAL /HPF
UROBILINOGEN UR QL STRIP: ABNORMAL
WBC UR QL AUTO: ABNORMAL /HPF

## 2021-08-09 PROCEDURE — 99212 OFFICE O/P EST SF 10 MIN: CPT | Performed by: FAMILY MEDICINE

## 2021-08-09 PROCEDURE — 81001 URINALYSIS AUTO W/SCOPE: CPT | Performed by: FAMILY MEDICINE

## 2021-08-09 NOTE — PROGRESS NOTES
"Chief Complaint  Difficulty Urinating    Subjective          Viktoria Mukherjee presents to [facility] for acute visit. Pt states she is currently having issues urinating. Some pain with urinating this morning and increase frequency.   Denies changes in color or smell.   No scratch or lesion.     History of Present Illness    Objective   Vital Signs:   /62 (BP Location: Right arm, Patient Position: Sitting, Cuff Size: Adult)   Temp 96.9 °F (36.1 °C) (Temporal)   Resp 16   Ht 152.4 cm (60\")   Wt 71.5 kg (157 lb 9.6 oz)   BMI 30.78 kg/m²     Physical Exam  Constitutional:       General: She is not in acute distress.     Appearance: She is not ill-appearing.   Neurological:      Mental Status: She is alert.   Psychiatric:         Mood and Affect: Mood normal.        Result Review :   The following data was reviewed by: Audrey Chew MD on 08/09/2021:  UA    Urinalysis 10/8/20 10/8/20 8/9/21    0905 0905    Specific Gravity, UA   <=1.005   Ketones, UA   Negative   Blood, UA Negative  Trace (A)   Leukocytes, UA Negative  Negative   Nitrite, UA Negative  Negative   RBC, UA  None seen    Bacteria, UA  None seen    (A) Abnormal value                      Assessment and Plan    Diagnoses and all orders for this visit:    1. Pain with urination (Primary)  -     Urinalysis With Culture If Indicated - Urine, Clean Catch  -     Urinalysis, Microscopic Only - Urine, Clean Catch    Pt would like to send urine out for culture.  Advise to drink plenty of water. Call if worsening of symptoms.        Follow Up   Return if symptoms worsen or fail to improve.  Patient was given instructions and counseling regarding her condition or for health maintenance advice. Please see specific information pulled into the AVS if appropriate.       "

## 2021-08-10 ENCOUNTER — OFFICE VISIT (OUTPATIENT)
Dept: SLEEP MEDICINE | Facility: HOSPITAL | Age: 51
End: 2021-08-10

## 2021-08-10 VITALS
BODY MASS INDEX: 30.82 KG/M2 | HEIGHT: 60 IN | OXYGEN SATURATION: 99 % | SYSTOLIC BLOOD PRESSURE: 110 MMHG | HEART RATE: 66 BPM | DIASTOLIC BLOOD PRESSURE: 69 MMHG | WEIGHT: 157 LBS

## 2021-08-10 DIAGNOSIS — G47.11 IDIOPATHIC HYPERSOMNIA WITH LONG SLEEP TIME: Primary | ICD-10-CM

## 2021-08-10 LAB
BACTERIA UR CULT: NO GROWTH
BACTERIA UR CULT: NORMAL

## 2021-08-10 PROCEDURE — G0463 HOSPITAL OUTPT CLINIC VISIT: HCPCS

## 2021-08-10 RX ORDER — MODAFINIL 200 MG/1
200 TABLET ORAL DAILY
Qty: 30 TABLET | Refills: 5 | Status: SHIPPED | OUTPATIENT
Start: 2021-08-10 | End: 2021-10-13 | Stop reason: SDUPTHER

## 2021-08-10 NOTE — PROGRESS NOTES
Sleep Medicine follow up  Viktoria Mukherjee  : 1970  50 y.o. female   Date of Service: 8/10/2021  Referring provider: Rell Bowden MD    History Of Present Illness:   Mrs. Viktoria Mukherjee  is a 50 y.o. patient with history of asthma and hypothyroidism and did tic disorder and has Mild tremors of the jaw and neck and was told that she has tics and tremors at [facility] movement disorder clinic.  The patient reports that they have placed on guaifenesin which benefits her jaw tremors and tics.     Patient is here for a follow-up visit regarding Excessive Daytime Sleepiness and Chronic Fatigue.    The patient overnight polysomnography was normal with no sleep apnea or periodic limb movement disorder and angina she slept for 394 minutes and subsequent multiple sleep latency testing revealing mean sleep latency of 2.8 minutes but no sleep onset REM.    She feels energetic and functional and 200 mg of Provigil caused her to have palpitations and hence she has cut down the dose to 1/4 th tablet twice a day (50 mg twice a day).    Sleep schedule: Bedtime:9-11 p.m, gets out of bed at 6:30 AM, sleep latency: 30 minutes, Gets about 7-9 hours of sleep.  On weekends she may take naps in the afternoon.    Interval history:8/10/2021 The patient returns for follow-up visit and feels that Provigil has been beneficial taken 100 mg (1/2 of 200 mg tablets) twice a day and no complaints of insomnia or any other side effects.  She can function quite well for most of the day.    Hudson Sleepiness Scale score: 9/24 today with modafinil and modafinil has been immensely beneficial to help her stay awake..    Review of Systems   HENT: Positive for postnasal drip.    Musculoskeletal: Positive for arthralgias and myalgias.   Psychiatric/Behavioral: Positive for dysphoric mood.   All other systems reviewed and are negative.    Patient examination:  Vitals:    08/10/21 0819   BP: 110/69   Pulse: 66   SpO2: 99%   Weight:  "71.2 kg (157 lb)   Height: 152.4 cm (60\")   BMI 28.34  HEENT: Normal   Neck: Supple no carotid bruits.  Lungs: Clear to auscultation.  Cardiac exam: Normal and regular rate and rhythm. No murmurs.  Extremities: No edema clubbing or cyanosis.    Date 11/21/2017 3/120/2018 9/18/18 4/23/19 7/21/20 8/10/2021      Welcome 15 12 6  7 12 9       MRI Brain in the past was normal.   2/16/2018: NPSG followed by MSLT: Overnight polysomnography performed the night prior to the MSLT study revealed no evidence for obstructive sleep apnea syndrome or PLMD.  The patient slept for 394 minutes with sleep onset latency of 9.6 minutes and latency to REM sleep from sleep onset 272 minutes.  Urine drug screen was negative.     The MSLT study revealed a mean sleep latency of 2.8 minutes and 0 Sleep Onset REM (SOREM’s) were seen.     ASSESSMENT AND PLAN:The patient has Idiopathic hypersomnia.  The patient is greatly benefiting from Provigil. We will treat the patient with Provigil 100 mg twice a day as a single dose of 200 mg dose causes her to have palpitations when she takes the modafinil 100 mg at 6 AM and 100 mg at 12 noon.  We will get the new authorization again.    Sleep hygiene techniques discussed.  Exercise diet and weight loss discussed.      Encounter Diagnosis   Name Primary?   • Idiopathic hypersomnia with long sleep time Yes     Return in about 6 months (around 2/10/2022).    Rell Bowden MD  8/10/2021  08:51 EDT  "

## 2021-08-24 ENCOUNTER — APPOINTMENT (OUTPATIENT)
Dept: WOMENS IMAGING | Facility: HOSPITAL | Age: 51
End: 2021-08-24

## 2021-08-24 PROCEDURE — 77063 BREAST TOMOSYNTHESIS BI: CPT | Performed by: RADIOLOGY

## 2021-08-24 PROCEDURE — 77067 SCR MAMMO BI INCL CAD: CPT | Performed by: RADIOLOGY

## 2021-10-13 DIAGNOSIS — G47.11 IDIOPATHIC HYPERSOMNIA WITH LONG SLEEP TIME: ICD-10-CM

## 2021-10-13 RX ORDER — MODAFINIL 200 MG/1
200 TABLET ORAL DAILY
Qty: 30 TABLET | Refills: 5 | Status: SHIPPED | OUTPATIENT
Start: 2021-10-13 | End: 2022-02-08 | Stop reason: SDUPTHER

## 2021-10-15 ENCOUNTER — OFFICE VISIT (OUTPATIENT)
Dept: INTERNAL MEDICINE | Facility: CLINIC | Age: 51
End: 2021-10-15

## 2021-10-15 VITALS
HEART RATE: 62 BPM | WEIGHT: 158.6 LBS | BODY MASS INDEX: 31.14 KG/M2 | TEMPERATURE: 97.7 F | OXYGEN SATURATION: 98 % | SYSTOLIC BLOOD PRESSURE: 110 MMHG | HEIGHT: 60 IN | DIASTOLIC BLOOD PRESSURE: 72 MMHG

## 2021-10-15 DIAGNOSIS — Z00.00 ANNUAL PHYSICAL EXAM: Primary | ICD-10-CM

## 2021-10-15 DIAGNOSIS — M62.830 BACK MUSCLE SPASM: ICD-10-CM

## 2021-10-15 DIAGNOSIS — Z23 NEED FOR VACCINATION: ICD-10-CM

## 2021-10-15 PROBLEM — Z12.11 COLON CANCER SCREENING: Status: ACTIVE | Noted: 2021-09-23

## 2021-10-15 LAB
ALBUMIN SERPL-MCNC: 4.6 G/DL (ref 3.5–5.2)
ALBUMIN/GLOB SERPL: 2.4 G/DL
ALP SERPL-CCNC: 59 U/L (ref 39–117)
ALT SERPL-CCNC: 15 U/L (ref 1–33)
APPEARANCE UR: CLEAR
AST SERPL-CCNC: 18 U/L (ref 1–32)
BILIRUB SERPL-MCNC: 0.7 MG/DL (ref 0–1.2)
BILIRUB UR QL STRIP: NEGATIVE
BUN SERPL-MCNC: 10 MG/DL (ref 6–20)
BUN/CREAT SERPL: 11.5 (ref 7–25)
CALCIUM SERPL-MCNC: 9.8 MG/DL (ref 8.6–10.5)
CHLORIDE SERPL-SCNC: 101 MMOL/L (ref 98–107)
CHOLEST SERPL-MCNC: 217 MG/DL (ref 0–200)
CHOLEST/HDLC SERPL: 3.19 {RATIO}
CO2 SERPL-SCNC: 28.4 MMOL/L (ref 22–29)
COLOR UR: YELLOW
CREAT SERPL-MCNC: 0.87 MG/DL (ref 0.57–1)
ERYTHROCYTE [DISTWIDTH] IN BLOOD BY AUTOMATED COUNT: 12.1 % (ref 12.3–15.4)
GLOBULIN SER CALC-MCNC: 1.9 GM/DL
GLUCOSE SERPL-MCNC: 98 MG/DL (ref 65–99)
GLUCOSE UR QL: NEGATIVE
HCT VFR BLD AUTO: 42.3 % (ref 34–46.6)
HDLC SERPL-MCNC: 68 MG/DL (ref 40–60)
HGB BLD-MCNC: 14.4 G/DL (ref 12–15.9)
HGB UR QL STRIP: NEGATIVE
KETONES UR QL STRIP: NEGATIVE
LDLC SERPL CALC-MCNC: 135 MG/DL (ref 0–100)
LEUKOCYTE ESTERASE UR QL STRIP: NEGATIVE
MCH RBC QN AUTO: 33.6 PG (ref 26.6–33)
MCHC RBC AUTO-ENTMCNC: 34 G/DL (ref 31.5–35.7)
MCV RBC AUTO: 98.8 FL (ref 79–97)
NITRITE UR QL STRIP: NEGATIVE
PH UR STRIP: 7.5 [PH] (ref 5–8)
PLATELET # BLD AUTO: 261 10*3/MM3 (ref 140–450)
POTASSIUM SERPL-SCNC: 4.4 MMOL/L (ref 3.5–5.2)
PROT SERPL-MCNC: 6.5 G/DL (ref 6–8.5)
PROT UR QL STRIP: NEGATIVE
RBC # BLD AUTO: 4.28 10*6/MM3 (ref 3.77–5.28)
SODIUM SERPL-SCNC: 139 MMOL/L (ref 136–145)
SP GR UR: 1.01 (ref 1–1.03)
TRIGL SERPL-MCNC: 77 MG/DL (ref 0–150)
TSH SERPL DL<=0.005 MIU/L-ACNC: 4 UIU/ML (ref 0.27–4.2)
UROBILINOGEN UR STRIP-MCNC: NORMAL MG/DL
VLDLC SERPL CALC-MCNC: 14 MG/DL (ref 5–40)
WBC # BLD AUTO: 5.69 10*3/MM3 (ref 3.4–10.8)

## 2021-10-15 PROCEDURE — 99396 PREV VISIT EST AGE 40-64: CPT | Performed by: INTERNAL MEDICINE

## 2021-10-15 PROCEDURE — 90471 IMMUNIZATION ADMIN: CPT | Performed by: INTERNAL MEDICINE

## 2021-10-15 PROCEDURE — 93000 ELECTROCARDIOGRAM COMPLETE: CPT | Performed by: INTERNAL MEDICINE

## 2021-10-15 PROCEDURE — 90715 TDAP VACCINE 7 YRS/> IM: CPT | Performed by: INTERNAL MEDICINE

## 2021-10-15 RX ORDER — DESVENLAFAXINE 100 MG/1
TABLET, EXTENDED RELEASE ORAL
COMMUNITY
Start: 2021-08-22

## 2021-10-15 RX ORDER — TIZANIDINE HYDROCHLORIDE 4 MG/1
4 CAPSULE, GELATIN COATED ORAL 3 TIMES DAILY PRN
Qty: 30 CAPSULE | Refills: 0 | Status: SHIPPED | OUTPATIENT
Start: 2021-10-15 | End: 2022-01-17 | Stop reason: SDUPTHER

## 2021-10-15 NOTE — PROGRESS NOTES
Chief Complaint   Patient presents with   • Annual Exam     Physical       Subjective   Viktoria Mukherjee is a 50 y.o. female.     History of Present Illness     She is here for annual examination.    She generally feels healthy but is having problems with back pain.  Her appetite is good.    She tries to follow a balanced diet.    She sleeps well.    Her energy is good.    She exercises regularly.    She is up to date on dental and eye exams.     She woke up with back pain about 3 days ago.  It feels like muscle spasms across her lower back and middle back.  She does not recall any specific precipitating factor.  It hurts with movement.  It wakes her up if she turns over in bed during the night.               The following portions of the patient's history were reviewed and updated as appropriate: allergies, current medications, past family history, past medical history, past social history, past surgical history and problem list.      Current Outpatient Medications:   •  desvenlafaxine (PRISTIQ) 100 MG 24 hr tablet, , Disp: , Rfl:   •  Dulera 100-5 MCG/ACT inhaler, INHALE TWO PUFFS BY MOUTH TWICE A DAY, Disp: 13 g, Rfl: 0  •  estradiol (ESTRACE) 0.1 MG/GM vaginal cream, Insert 2 g into the vagina Daily., Disp: , Rfl:   •  guanFACINE (TENEX) 2 MG tablet, TAKE ONE TABLET BY MOUTH TWICE A DAY, Disp: 60 tablet, Rfl: 5  •  levothyroxine (SYNTHROID, LEVOTHROID) 88 MCG tablet, Take 88 mcg by mouth Daily., Disp: , Rfl:   •  magnesium oxide (MAGOX) 400 (241.3 Mg) MG tablet tablet, Take 400 mg by mouth Daily., Disp: , Rfl:   •  modafinil (PROVIGIL) 200 MG tablet, Take 1 tablet by mouth Daily., Disp: 30 tablet, Rfl: 5  •  montelukast (SINGULAIR) 10 MG tablet, TAKE ONE TABLET BY MOUTH DAILY, Disp: 30 tablet, Rfl: 8  •  Multiple Vitamin (MULTI VITAMIN DAILY) tablet, Take  by mouth., Disp: , Rfl:   •  NON FORMULARY, Testosterone vaginal tablet, Disp: , Rfl:   •  Omega-3 Fatty Acids (FISH OIL) 1000 MG capsule capsule, Take  by mouth  "Daily With Breakfast., Disp: , Rfl:   •  TiZANidine (ZANAFLEX) 4 MG capsule, Take 1 capsule by mouth 3 (Three) Times a Day As Needed for Muscle Spasms., Disp: 30 capsule, Rfl: 0           Review of Systems   Constitutional: Negative for appetite change and fever.   HENT: Negative for ear pain, nosebleeds, sore throat and trouble swallowing.    Eyes: Negative for blurred vision and double vision.   Respiratory: Negative for cough and shortness of breath.    Cardiovascular: Negative for chest pain, palpitations and leg swelling.   Gastrointestinal: Negative for abdominal pain, constipation, diarrhea, nausea and vomiting.   Endocrine: Negative for cold intolerance, heat intolerance, polydipsia and polyuria.   Genitourinary: Negative for breast discharge, breast lump and dysuria.   Musculoskeletal: Positive for back pain.   Skin: Negative for rash.   Neurological: Negative for headache.   Hematological: Negative for adenopathy. Does not bruise/bleed easily.   Psychiatric/Behavioral: Negative for depressed mood. The patient is not nervous/anxious.            Objective     /72 (BP Location: Left arm, Patient Position: Sitting, Cuff Size: Adult)   Pulse 62   Temp 97.7 °F (36.5 °C)   Ht 152.4 cm (60\")   Wt 71.9 kg (158 lb 9.6 oz)   SpO2 98%   BMI 30.97 kg/m²       Physical Exam  Vitals and nursing note reviewed.   Constitutional:       General: She is not in acute distress.     Appearance: Normal appearance. She is well-developed.   HENT:      Right Ear: Tympanic membrane and ear canal normal.      Left Ear: Tympanic membrane and ear canal normal.      Nose:      Right Sinus: No maxillary sinus tenderness or frontal sinus tenderness.      Left Sinus: No maxillary sinus tenderness or frontal sinus tenderness.   Eyes:      General: No scleral icterus.     Conjunctiva/sclera: Conjunctivae normal.      Pupils: Pupils are equal, round, and reactive to light.   Neck:      Thyroid: No thyromegaly.      Vascular: Normal " carotid pulses. No carotid bruit.      Trachea: No tracheal deviation.   Cardiovascular:      Rate and Rhythm: Regular rhythm.      Pulses:           Carotid pulses are 2+ on the right side and 2+ on the left side.     Heart sounds: S1 normal and S2 normal. No murmur heard.  No friction rub. No gallop.    Pulmonary:      Effort: Pulmonary effort is normal.      Breath sounds: Normal breath sounds. No wheezing, rhonchi or rales.   Abdominal:      General: Bowel sounds are normal.      Palpations: Abdomen is soft.      Tenderness: There is no abdominal tenderness. There is no guarding or rebound.   Musculoskeletal:      Cervical back: Normal range of motion and neck supple.      Right lower leg: No edema.      Left lower leg: No edema.   Lymphadenopathy:      Cervical: No cervical adenopathy.   Skin:     General: Skin is warm and dry.   Neurological:      Mental Status: She is alert and oriented to person, place, and time.      Cranial Nerves: No cranial nerve deficit.      Coordination: Coordination normal.      Deep Tendon Reflexes:      Reflex Scores:       Patellar reflexes are 2+ on the right side and 2+ on the left side.        ECG 12 Lead    Date/Time: 10/15/2021 10:30 AM  Performed by: Radha Gama MA  Authorized by: Maureen Husain MD   Comparison: compared with previous ECG from 10/1/2019  Similar to previous ECG  Rhythm: sinus bradycardia  Rate: bradycardic  BPM: 57  Conduction: conduction normal  ST Segments: ST segments normal  T Waves: T waves normal  QRS axis: normal  Clinical impression comment: Normal except for rate                Assessment/Plan   Diagnoses and all orders for this visit:    1. Annual physical exam (Primary)  -     Comprehensive Metabolic Panel  -     Lipid Panel With / Chol / HDL Ratio  -     CBC (No Diff)  -     Urinalysis With Microscopic If Indicated (No Culture) - Urine, Clean Catch  -     TSH Rfx On Abnormal To Free T4    2. Need for vaccination  -     Tdap Vaccine Greater  Than or Equal To 8yo IM    3. Back muscle spasm  -     TiZANidine (ZANAFLEX) 4 MG capsule; Take 1 capsule by mouth 3 (Three) Times a Day As Needed for Muscle Spasms.  Dispense: 30 capsule; Refill: 0    Other orders  -     ECG 12 Lead    Encouraged prudent diet, regular exercise, maintenance of healthy weight, good sleep habits,  avoidance of tobacco products, excessive alcohol. She will take advil or aleve for back pain, also tizanidine.  Advised her of potential for drowsiness with this. Advised her she could also use salon pas products.  If pain does not improve, she will call.               Return in about 6 months (around 4/15/2022) for 30 (THIRTY) min follow up.

## 2021-10-31 DIAGNOSIS — J30.9 ALLERGIC RHINITIS: ICD-10-CM

## 2021-11-01 RX ORDER — MONTELUKAST SODIUM 10 MG/1
TABLET ORAL
Qty: 30 TABLET | Refills: 8 | Status: SHIPPED | OUTPATIENT
Start: 2021-11-01 | End: 2022-04-26 | Stop reason: SDUPTHER

## 2022-01-10 NOTE — TELEPHONE ENCOUNTER
Caller: Viktoria Mukherjee    Relationship: Self    Best call back number:     Viktoria Mukherjee (Self) [phone] (H)     HAS NEW PATIENT APPT IN April, BUT NEEDS REFILLS TO LAST HER UNTIL APPT       Requested Prescriptions:   Requested Prescriptions     Pending Prescriptions Disp Refills   • mometasone-formoterol (Dulera) 100-5 MCG/ACT inhaler 13 g 0     Si puffs 2 (Two) Times a Day.        Pharmacy where request should be sent: [address] & ([facility])  [phone] [facility] [phone] FX     Additional details provided by patient:     Does the patient have less than a 3 day supply:  [x] Yes  [] No    Marika Magallon Rep   01/10/22 14:19 EST

## 2022-01-17 ENCOUNTER — OFFICE VISIT (OUTPATIENT)
Dept: INTERNAL MEDICINE | Facility: CLINIC | Age: 52
End: 2022-01-17

## 2022-01-17 VITALS
HEART RATE: 75 BPM | TEMPERATURE: 98.2 F | OXYGEN SATURATION: 98 % | SYSTOLIC BLOOD PRESSURE: 126 MMHG | HEIGHT: 60 IN | DIASTOLIC BLOOD PRESSURE: 70 MMHG | BODY MASS INDEX: 31.41 KG/M2 | WEIGHT: 160 LBS

## 2022-01-17 DIAGNOSIS — M54.12 CERVICAL RADICULOPATHY: Primary | ICD-10-CM

## 2022-01-17 PROCEDURE — 99213 OFFICE O/P EST LOW 20 MIN: CPT | Performed by: NURSE PRACTITIONER

## 2022-01-17 RX ORDER — TIZANIDINE HYDROCHLORIDE 4 MG/1
4 CAPSULE, GELATIN COATED ORAL 3 TIMES DAILY PRN
Qty: 30 CAPSULE | Refills: 0 | Status: SHIPPED | OUTPATIENT
Start: 2022-01-17 | End: 2022-04-26

## 2022-01-17 NOTE — ASSESSMENT & PLAN NOTE
MRI of cervical spine.   Zanaflex as needed.   Tylenol 650mg every six hours as needed for pain.   Referral for physical therapy.   Ice/heat treatment per patient's preference.

## 2022-01-17 NOTE — PROGRESS NOTES
"Chief Complaint  Shoulder Pain (shoulder pain, tingling and numbness radiates down right arm and neck)    Subjective          Viktoria Mukherjee presents to [facility]  History of Present Illness  This is a 50 y/o female presenting to office with complaints of right arm pain that is tingling and burning in nature that occurs from the neck down to the fingertip in second finger. Patient reports this has been ongoing x 1 month. Patient reports no significant trauma to the region of pain recently. Patient reports she has been using a neck traction device per her chiropractor. Patient reports she is currently using zanaflex and tylenol as needed for pain.     Objective   Vital Signs:   /70   Pulse 75   Temp 98.2 °F (36.8 °C)   Ht 152.4 cm (60\")   Wt 72.6 kg (160 lb)   SpO2 98%   BMI 31.25 kg/m²     Physical Exam  Vitals and nursing note reviewed.   Constitutional:       Appearance: Normal appearance. She is normal weight.   HENT:      Head: Normocephalic and atraumatic.   Eyes:      Extraocular Movements: Extraocular movements intact.      Conjunctiva/sclera: Conjunctivae normal.      Pupils: Pupils are equal, round, and reactive to light.      Comments: +glasses   Cardiovascular:      Rate and Rhythm: Normal rate and regular rhythm.      Pulses: Normal pulses.      Heart sounds: Normal heart sounds. No murmur heard.  No friction rub. No gallop.    Pulmonary:      Effort: Pulmonary effort is normal. No respiratory distress.      Breath sounds: Normal breath sounds. No stridor. No wheezing, rhonchi or rales.   Abdominal:      Palpations: Abdomen is soft.   Musculoskeletal:         General: Tenderness present.      Right upper arm: Tenderness present.      Cervical back: Normal range of motion and neck supple. Spasms and tenderness present.      Comments: +spasms/radiculopathy to right arm   Skin:     General: Skin is warm and dry.   Neurological:      Mental Status: She is alert and " oriented to person, place, and time. Mental status is at baseline.   Psychiatric:         Mood and Affect: Mood normal.         Behavior: Behavior normal.         Thought Content: Thought content normal.         Judgment: Judgment normal.        Result Review :   The following data was reviewed by: ROSALIA Bishop on 01/17/2022:  Common labs    Common Labsle 10/15/21 10/15/21 10/15/21    0947 0947 0947   Glucose 98     BUN 10     Creatinine 0.87     eGFR Non  Am 69     eGFR African Am 84     Sodium 139     Potassium 4.4     Chloride 101     Calcium 9.8     Total Protein 6.5     Albumin 4.60     Total Bilirubin 0.7     Alkaline Phosphatase 59     AST (SGOT) 18     ALT (SGPT) 15     WBC   5.69   Hemoglobin   14.4   Hematocrit   42.3   Platelets   261   Total Cholesterol  217 (A)    Triglycerides  77    HDL Cholesterol  68 (A)    LDL Cholesterol   135 (A)    (A) Abnormal value       Comments are available for some flowsheets but are not being displayed.           Reviewed:  XR Spine Cervical Complete 4 or 5 View (03/25/2019 10:13 AM)           Assessment and Plan    Diagnoses and all orders for this visit:    1. Cervical radiculopathy (Primary)  Assessment & Plan:  MRI of cervical spine.   Zanaflex as needed.   Tylenol 650mg every six hours as needed for pain.   Referral for physical therapy.   Ice/heat treatment per patient's preference.     Orders:  -     MRI Cervical Spine Without Contrast; Future  -     TiZANidine (ZANAFLEX) 4 MG capsule; Take 1 capsule by mouth 3 (Three) Times a Day As Needed for Muscle Spasms.  Dispense: 30 capsule; Refill: 0  -     Ambulatory Referral to Physical Therapy Evaluate and treat      Follow Up   Return if symptoms worsen or fail to improve.  Patient was given instructions and counseling regarding her condition or for health maintenance advice. Please see specific information pulled into the AVS if appropriate.       Answers for HPI/ROS submitted by the patient on  1/15/2022  Please describe your symptoms.: Tingling sensations running down arm to hand on right side like falling asleep, numbness in pointer finger, intermittent pain in shoulder and arm /bicep  Have you had these symptoms before?: No  How long have you been having these symptoms?: Greater than 2 weeks  Please list any medications you are currently taking for this condition.: Tylenol as needed, been using friends neck traction  Please describe any probable cause for these symptoms. : Thinking it is neck, have worked to adjust typing and mouse us and desk positions,  What is the primary reason for your visit?: Other

## 2022-02-02 ENCOUNTER — HOSPITAL ENCOUNTER (OUTPATIENT)
Dept: PHYSICAL THERAPY | Facility: HOSPITAL | Age: 52
Setting detail: THERAPIES SERIES
Discharge: HOME | End: 2022-02-02

## 2022-02-02 DIAGNOSIS — M54.12 CERVICAL RADICULOPATHY: Primary | ICD-10-CM

## 2022-02-02 DIAGNOSIS — M54.2 NECK PAIN: ICD-10-CM

## 2022-02-02 PROCEDURE — 97110 THERAPEUTIC EXERCISES: CPT | Performed by: PHYSICAL THERAPIST

## 2022-02-02 PROCEDURE — 97161 PT EVAL LOW COMPLEX 20 MIN: CPT | Performed by: PHYSICAL THERAPIST

## 2022-02-02 NOTE — THERAPY EVALUATION
Outpatient Physical Therapy Ortho Initial Evaluation  [facility]     Patient Name: Viktoria Mukherjee  : 1970  MRN: [id number]  Today's Date: 2022      Visit Date: 2022    Patient Active Problem List   Diagnosis   • Hypothyroidism, adult   • Allergic rhinitis   • Asthma   • Tic disorder   • Idiopathic hypersomnia with long sleep time   • Chronic fatigue   • Sleep concern   • Bruxism   • Colon cancer screening   • Cervical radiculopathy        Past Medical History:   Diagnosis Date   • Allergic Mold   • Allergic rhinitis    • Arthritis Various joints   • Asthma    • Depression Managed w meds   • Disease of thyroid gland    • Hypothyroidism On meds   • Tic disorder    • Tremor In jaw, on meds        Past Surgical History:   Procedure Laterality Date   •  SECTION  2006   • DIAGNOSTIC LAPAROSCOPY         Visit Dx:     ICD-10-CM ICD-9-CM   1. Cervical radiculopathy  M54.12 723.4   2. Neck pain  M54.2 723.1          Patient History     Row Name 22 1530             History    Chief Complaint Pain; Tinglings  -JS      Type of Pain Neck pain; Upper Extremity / Arm  -JS      Date Current Problem(s) Began 12/15/21  -JS      Brief Description of Current Complaint Patient with c/o neck pain & R UE pain/tingling since mid-2021. Patient reports symptoms increased upon yayo COVID around Misael time. Patient has had similar symptoms in the past, therefore has made modifications to work desk & now uses mouse in L hand. Patient used cervical traction at home (borrowed a friend's) which helped symptoms & patient no longer uses as symptoms have improved. Greatest complaint currently is strong rush of tingling/R UE pain/hand 7-8x/day. Also notes some decreased strength in R UE. MRI scheduled in 2 weeks.  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  -JS              Pain     Pain Location Arm  -JS      Pain at Present 1; 0  -JS      Pain at Best 0  -JS      Pain at Worst 6; 7  -JS      What Performance Factors Make the Current Problem(s) WORSE? Reaching overhead & to the floor, rising from sitting  -JS      What Performance Factors Make the Current Problem(s) BETTER? Shake arm/hand  -JS      Is your sleep disturbed? No  -JS      Difficulties at work? Rising from sitting at work  -JS      Difficulties with ADL's? Reaching, rising from sitting  -JS      Difficulties with recreational activities? Attends strengthening class 1x/week, Yoga 1x/week  -JS              Fall Risk Assessment    Any falls in the past year: No  -JS              Daily Activities    Primary Language English  -JS      Are you able to read Yes  -JS      Are you able to write Yes  -JS      Pt Participated in POC and Goals Yes  -JS              Safety    Are you being hurt, hit, or frightened by anyone at home or in your life? No  -JS      Are you being neglected by a caregiver No  -JS            User Key  (r) = Recorded By, (t) = Taken By, (c) = Cosigned By    Initials Name Provider Type    JS Kailee Ayala, PT Physical Therapist                 PT Ortho     Row Name 02/02/22 4741       Subjective Comments    Subjective Comments R UE radicular painful tingling occurs 7-8x/day  -JS       Subjective Pain    Able to rate subjective pain? yes  -JS    Pre-Treatment Pain Level 1  -JS       Posture/Observations    Alignment Options Rounded shoulders  -JS    Rounded Shoulders Bilateral:; Mild  -JS    Posture/Observations Comments R shoulder slightly low  -JS       Quarter Clearing    Quarter Clearing Upper Quarter Clearing  -JS       Neural Tension Signs- Upper Quarter Clearing    ULNTT 1 Right:  median nn tension test produces tightness but not R UE tingling  -JS       Myotomal Screen- Upper Quarter Clearing    Shoulder flexion (C5) Bilateral:; 5 (Normal)  -JS    Elbow  flexion/wrist extension (C6) Bilateral:; 5 (Normal)  -JS    Elbow extension/wrist flexion (C7) Bilateral:; 5 (Normal)  -JS    Finger flexion/ (C8) Bilateral:; 5 (Normal)  -JS    Finger abduction (T1) Bilateral:; 5 (Normal)  -JS       Cervical/Shoulder ROM Screen    Cervical flexion Normal  -JS    Cervical extension Impaired  Completes 75% with tightness  -JS    Cervical lateral flexion Normal  Endrange tightness  -JS    Cervical rotation Impaired  Completes 75% with tightness  -JS       Special Tests/Palpation    Special Tests/Palpation Cervical/Thoracic  -JS       Cervical/Thoracic Special Tests    Spurlings (Foraminal Compression) Right:; Positive; Left:; Negative  -JS    Cervical Compression (Forarminal Compression vs. Facet Pain) Negative  -JS    Cervical Distraction (Foraminal Compression vs. Facet Pain) Positive  -JS       General ROM    GENERAL ROM COMMENTS UE AROM WNL B  -JS       MMT (Manual Muscle Testing)    Rt Upper Ext Rt Shoulder Internal Rotation; Rt Shoulder External Rotation  -JS    Lt Upper Ext Lt Shoulder Internal Rotation; Lt Shoulder External Rotation  -JS       MMT Right Upper Ext    Rt Shoulder Internal Rotation MMT, Gross Movement (5/5) normal  -JS    Rt Shoulder External Rotation MMT, Gross Movement (4+/5) good plus  -JS    Rt Upper Extremity Comments  Scapular strength: Rhomboids 3+/5, Mid trap 3+/5, Low trap 3+/5  -JS       MMT Left Upper Ext    Lt Shoulder Internal Rotation MMT, Gross Movement (5/5) normal  -JS    Lt Shoulder External Rotation MMT, Gross Movement (5/5) normal  -JS    Lt Upper Extremity Comments  Scapular strength: Rhomboids 4+/5, Mid Trap 4/5, Low trap 3+/5  -JS       Sensation    Sensation WNL? WNL  -JS    Light Touch No apparent deficits  -JS       Flexibility    Flexibility Tested? Upper Extremity  -JS       Upper Extremity Flexibility    Upper Trapezius Bilateral:; Mildly limited  -JS    Pect Minor Bilateral:; Mildly limited  -JS       Gait/Stairs (Locomotion)     Comment (Gait/Stairs) Normal gait  -          User Key  (r) = Recorded By, (t) = Taken By, (c) = Cosigned By    Initials Name Provider Type    Kailee Bernal, PT Physical Therapist                            Therapy Education  Education Details: Role of outpatient PT, POC, differential diagnosis, initial HEP, expectations.Issued initial HEP via "[employer] [id number].  Discussed postural awareness in sitting, work station ergonomics. Discussed use of step stool to reach stackable dryer with improved mechanics.  Given: HEP, Posture/body mechanics  Program: New  How Provided: Verbal, Demonstration, Written  Provided to: Patient  Level of Understanding: Teach back education performed, Verbalized, Demonstrated      PT OP Goals     Row Name 02/02/22 1530          PT Short Term Goals    STG Date to Achieve 02/25/22  -     STG 1 Patient will demonstrate an independent initial HEP without exacerbation of symptoms.  -     STG 1 Progress New  -     STG 2 Patient will be independent in postural awareness during sitting for work, and reaching during functional tasks.  -     STG 2 Progress New  [facility]            Long Term Goals    LTG Date to Achieve 04/01/22  -     LTG 1 Patient will demonstrate independence with comprehensive HEP to allow self management of symptoms and decrease risk of recurrence.  -     LTG 1 Progress New  -     LTG 2 Patient will demonstrate normal cervical AROM without c/o pain/tightness.  -     LTG 2 Progress New  -     LTG 3 Patient will demonstrate improvement in scapular strength to 4/5 or better (R mid,low trap & rhomboids, L low trap) for improved mechanics.  -     LTG 3 Progress New  -     LTG 4 Patient will report 75% or better improvement in occurrance of R UE radicular symptoms.  -     LTG 4 Progress New  [facility]            Time Calculation    PT Goal Re-Cert Due Date 05/02/22  -           User Key  (r) = Recorded By, (t) = Taken By, (c) = Cosigned By    Initials Name Provider  Type    JS Kailee Ayala, PT Physical Therapist                 PT Assessment/Plan     Row Name 02/02/22 9110          PT Assessment    Functional Limitations Limitation in home management; Limitations in functional capacity and performance  -JS     Impairments Impaired flexibility; Muscle strength; Pain; Posture; Range of motion  -JS     Assessment Comments Viktoria Mukherjee is a 51 y.o. year-old female referred to physical therapy for cervical radiculopathy with neck pain and R UE tingling. She presents with a evolving clinical presentation with improving symptoms but ongoing intermittent neck pain and R UE tingling to hand during reaching and transitional movements (ie getting out of car, rising from sitting).  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  Signs and symptoms are consistent with referring diagnosis with postural strength & flexibility deficits noted. Median neural tension testing reveals tightness though fails to reproduce R UE tingling sensation.  Patient will benefit from skilled PHYSICAL THERAPY to address impairments and progress toward goals.  -JS     Please refer to paper survey for additional self-reported information Yes  -JS     Rehab Potential Good  -JS     Patient/caregiver participated in establishment of treatment plan and goals Yes  -JS     Patient would benefit from skilled therapy intervention Yes  -JS            PT Plan    PT Frequency 1x/week; 2x/week  -JS     Predicted Duration of Therapy Intervention (PT) 8 visits  -JS     Planned CPT's? PT EVAL LOW COMPLEXITY: 49175; PT RE-EVAL: 46801; PT THER PROC EA 15 MIN: 61986; PT THER ACT EA 15 MIN: 58295; PT MANUAL THERAPY EA 15 MIN: 04995; PT TRACTION CERVICAL: 09675  -JS     Physical Therapy Interventions (Optional Details) dry needling; home exercise program; joint mobilization;  manual therapy techniques; patient/family education; postural re-education; ROM (Range of Motion); strengthening; stretching  -JS     PT Plan Comments Pt scheduled 1x/week due to work schedule. Will continue PT 1-2x/week pending availability. Consider manual cervical distraction vs mechanical traction, possible gentle neural gliding for medial nn, B shoulder ER with foam roll for postural awareness.  -JS           User Key  (r) = Recorded By, (t) = Taken By, (c) = Cosigned By    Initials Name Provider Type    Kailee Bernal, PT Physical Therapist                   OP Exercises     Row Name 02/02/22 1530             Subjective Comments    Subjective Comments R UE radicular painful tingling occurs 7-8x/day  -JS              Subjective Pain    Able to rate subjective pain? yes  -JS      Pre-Treatment Pain Level 1  -JS              Total Minutes    87890 - PT Therapeutic Exercise Minutes 15  including education  -JS              Exercise 1    Exercise Name 1 Cervical retraction (seated)  -JS      Cueing 1 Verbal; Demo  -JS      Reps 1 10  -JS              Exercise 2    Exercise Name 2 Post shoulder rolls  -JS      Cueing 2 Verbal; Demo  -JS      Reps 2 10  -JS              Exercise 3    Exercise Name 3 Scapular retraction  -JS      Cueing 3 Verbal; Demo  -JS      Reps 3 10  -JS      Additional Comments Cues for neutral spine position during ex  -JS              Exercise 4    Exercise Name 4 Upper trap stretch (seated)  -JS      Cueing 4 Verbal; Demo  -JS      Reps 4 3  -JS      Time 4 10 sec  -JS              Exercise 5    Exercise Name 5 Pec doorway stretch (arms at 90 degrees)  -JS      Cueing 5 Verbal; Demo  -JS      Reps 5 3  -JS      Time 5 20 sec  -JS      Additional Comments Cues for neutral spine position  -JS            User Key  (r) = Recorded By, (t) = Taken By, (c) = Cosigned By    Initials Name Provider Type    Kailee Bernal, PT Physical Therapist                              Outcome Measure Options: Neck  Disability Index (NDI)  Neck Disability Index  Section 1 - Pain Intensity: I have no pain at the moment.  Section 2 - Personal Care: I can look after myself normally, but it causes extra pain.  Section 3 - Lifting: I can lift heavy weights without causing extra pain  Section 4 - Work: I can only do my usual work, but no more  Section 5 - Headaches: I have moderate headaches that come infrequently.  Section 6 - Concentration: I can concentrate fully without difficulty.  Section 7 - Sleeping: I have no trouble sleeping.  Section 8 - Driving: I can drive my car without neck pain  Section 9 - Reading: I can read as much as I want with no neck pain.  Section 10 - Recreation: I have some neck pain with all recreational activities.  Neck Disability Index Score: 5  Neck Disability Index Comments: 10%      Time Calculation:     Start Time: 1530  Stop Time: 1620  Time Calculation (min): 50 min  Timed Charges  12684 - PT Therapeutic Exercise Minutes: 15 (including education)  Total Minutes  Timed Charges Total Minutes: 15   Total Minutes: 15     Therapy Charges for Today     Code Description Service Date Service Provider Modifiers Qty    05294058318 HC PT THER PROC EA 15 MIN 2/2/2022 Kailee Ayala, PT GP 1    43066678300 HC PT EVAL LOW COMPLEXITY 2 2/2/2022 Kailee Ayala, PT GP 1          PT G-Codes  Outcome Measure Options: Neck Disability Index (NDI)  Neck Disability Index Score: 5         Kailee Ayala PT  2/2/2022

## 2022-02-08 ENCOUNTER — OFFICE VISIT (OUTPATIENT)
Dept: SLEEP MEDICINE | Facility: HOSPITAL | Age: 52
End: 2022-02-08

## 2022-02-08 VITALS
OXYGEN SATURATION: 99 % | DIASTOLIC BLOOD PRESSURE: 79 MMHG | WEIGHT: 159 LBS | BODY MASS INDEX: 31.22 KG/M2 | HEIGHT: 60 IN | HEART RATE: 81 BPM | SYSTOLIC BLOOD PRESSURE: 133 MMHG

## 2022-02-08 DIAGNOSIS — G47.11 IDIOPATHIC HYPERSOMNIA WITH LONG SLEEP TIME: Primary | ICD-10-CM

## 2022-02-08 PROCEDURE — G0463 HOSPITAL OUTPT CLINIC VISIT: HCPCS

## 2022-02-08 RX ORDER — MODAFINIL 200 MG/1
200 TABLET ORAL DAILY
Qty: 30 TABLET | Refills: 5 | Status: SHIPPED | OUTPATIENT
Start: 2022-02-08 | End: 2022-09-30 | Stop reason: SDUPTHER

## 2022-02-08 NOTE — PROGRESS NOTES
Sleep Medicine follow up  Viktoria Mukherjee  : 1970  51 y.o. female   Date of Service: 2022  Referring provider: Rell Bowden MD    History Of Present Illness:   Mrs. Viktoria Mukherjee  is a 51 y.o. patient with history of asthma and hypothyroidism and did tic disorder and has Mild tremors of the jaw and neck and was told that she has tics and tremors at [facility] movement disorder clinic.  The patient reports that they have placed on guaifenesin which benefits her jaw tremors and tics.     Patient is here for a follow-up visit regarding Excessive Daytime Sleepiness and Chronic Fatigue.    The patient overnight polysomnography was normal with no sleep apnea or periodic limb movement disorder and angina she slept for 394 minutes and subsequent multiple sleep latency testing revealing mean sleep latency of 2.8 minutes but no sleep onset REM.    She feels energetic and functional and 200 mg of Provigil caused her to have palpitations and hence she has cut down the dose to 1/4 th tablet twice a day (50 mg twice a day).    Sleep schedule: Bedtime:9-11 p.m, gets out of bed at 6:30 AM, sleep latency: 30 minutes, Gets about 7-9 hours of sleep.  On weekends she may take naps in the afternoon.    Interval history:2022 The patient returns for follow-up visit and feels that Provigil has been beneficial taken 100 mg (1/2 of 200 mg tablets) twice a day and no complaints of insomnia or any other side effects.  She can function quite well for most of the day.    [place] Sleepiness Scale score: 5/24 today with modafinil 200 mg daily and she takes 100 mg in the morning and 100 mg at noon and modafinil has been immensely beneficial to help her stay awake..    Review of Systems   Respiratory: Positive for cough.    Musculoskeletal: Positive for arthralgias and neck stiffness.   Allergic/Immunologic: Positive for environmental allergies.   Neurological: Positive for numbness (Right arm and hand numbness  "intermittently).   Psychiatric/Behavioral: Positive for dysphoric mood.   All other systems reviewed and are negative.    Patient examination:  Vitals:    02/08/22 0900   BP: 133/79   Pulse: 81   SpO2: 99%   Weight: 72.1 kg (159 lb)   Height: 152.4 cm (60\")   Body mass index is 31.05 kg/m².     Well-developed, well-nourished in no apparent distress.  Awake, alert and oriented with normal speech and language function.    Date 11/21/2017 3/120/2018 9/18/18 4/23/19 7/21/20 2/8/2022      [place] 15 12 6  7 12 5       MRI Brain in the past was normal.   2/16/2018: NPSG followed by MSLT: Overnight polysomnography performed the night prior to the MSLT study revealed no evidence for obstructive sleep apnea syndrome or PLMD.  The patient slept for 394 minutes with sleep onset latency of 9.6 minutes and latency to REM sleep from sleep onset 272 minutes.  Urine drug screen was negative.     The MSLT study revealed a mean sleep latency of 2.8 minutes and 0 Sleep Onset REM (SOREM’s) were seen.     ASSESSMENT AND PLAN:  The patient has Idiopathic hypersomnia.  The patient is greatly benefiting from Provigil. We will treat the patient with Provigil 100 mg twice a day as a single dose of 200 mg dose causes her to have palpitations when she takes the modafinil 100 mg at 6 AM and 100 mg at 12 noon.  We will get the new authorization again.    Sleep hygiene techniques discussed.  Exercise diet and weight loss discussed.      Encounter Diagnosis   Name Primary?   • Idiopathic hypersomnia with long sleep time Yes     Return in about 1 year (around 2/8/2023).    Rell Bowden MD  2/8/2022  10:17 EST  "

## 2022-02-11 ENCOUNTER — HOSPITAL ENCOUNTER (OUTPATIENT)
Dept: PHYSICAL THERAPY | Facility: HOSPITAL | Age: 52
Setting detail: THERAPIES SERIES
Discharge: HOME | End: 2022-02-11

## 2022-02-11 DIAGNOSIS — M54.12 CERVICAL RADICULOPATHY: Primary | ICD-10-CM

## 2022-02-11 DIAGNOSIS — M54.2 NECK PAIN: ICD-10-CM

## 2022-02-11 PROCEDURE — 97012 MECHANICAL TRACTION THERAPY: CPT

## 2022-02-11 PROCEDURE — 97110 THERAPEUTIC EXERCISES: CPT

## 2022-02-15 ENCOUNTER — HOSPITAL ENCOUNTER (OUTPATIENT)
Dept: MRI IMAGING | Facility: HOSPITAL | Age: 52
Discharge: HOME | End: 2022-02-15
Admitting: NURSE PRACTITIONER

## 2022-02-15 DIAGNOSIS — M54.12 CERVICAL RADICULOPATHY: ICD-10-CM

## 2022-02-15 PROCEDURE — 72141 MRI NECK SPINE W/O DYE: CPT

## 2022-02-15 RX ORDER — GUANFACINE 2 MG/1
2 TABLET ORAL 2 TIMES DAILY
Qty: 60 TABLET | Refills: 5 | Status: SHIPPED | OUTPATIENT
Start: 2022-02-15 | End: 2022-03-15

## 2022-02-15 NOTE — TELEPHONE ENCOUNTER
Caller: Viktoria Mukherjee    Relationship: Self    Best call back number: [phone]    Requested Prescriptions:   Requested Prescriptions     Pending Prescriptions Disp Refills   • guanFACINE (TENEX) 2 MG tablet 60 tablet 5     Sig: Take 1 tablet by mouth 2 (Two) Times a Day.        Pharmacy where request should be sent: [address] & ([facility]) - [phone] [facility] [phone] FX     Additional details provided by patient:OUT OF MEDICATION    Does the patient have less than a 3 day supply:  [x] Yes  [] No    Marika Shukla Rep   02/15/22 11:03 EST

## 2022-02-18 ENCOUNTER — HOSPITAL ENCOUNTER (OUTPATIENT)
Dept: PHYSICAL THERAPY | Facility: HOSPITAL | Age: 52
Setting detail: THERAPIES SERIES
Discharge: HOME | End: 2022-02-18

## 2022-02-18 DIAGNOSIS — M54.2 NECK PAIN: ICD-10-CM

## 2022-02-18 DIAGNOSIS — M54.12 CERVICAL RADICULOPATHY: Primary | ICD-10-CM

## 2022-02-18 PROCEDURE — 97110 THERAPEUTIC EXERCISES: CPT

## 2022-02-18 PROCEDURE — 97012 MECHANICAL TRACTION THERAPY: CPT

## 2022-02-18 NOTE — THERAPY TREATMENT NOTE
Outpatient Physical Therapy Ortho Treatment Note  [facility]     Patient Name: Viktoria Mukherjee  : 1970  MRN: [id number]  Today's Date: 2022      Visit Date: 2022    Visit Dx:    ICD-10-CM ICD-9-CM   1. Cervical radiculopathy  M54.12 723.4   2. Neck pain  M54.2 723.1       Patient Active Problem List   Diagnosis   • Hypothyroidism, adult   • Allergic rhinitis   • Asthma   • Tic disorder   • Idiopathic hypersomnia with long sleep time   • Chronic fatigue   • Sleep concern   • Bruxism   • Colon cancer screening   • Cervical radiculopathy        Past Medical History:   Diagnosis Date   • Allergic Mold   • Allergic rhinitis    • Arthritis Various joints   • Asthma    • Depression Managed w meds   • Disease of thyroid gland    • Hypothyroidism On meds   • Tic disorder    • Tremor In jaw, on meds        Past Surgical History:   Procedure Laterality Date   •  SECTION  2006   • DIAGNOSTIC LAPAROSCOPY                          PT Assessment/Plan     Row Name 22 0744          PT Assessment    Assessment Comments Ms. Mukherjee returns for her third visit. She did not notice much difference after the trial of ICTx, but is interested in trying it again.  Pt shows increased awareness of her postural habits and is actively working on changing them.  She is able to do a ggod chintuck both in supine and standing with no increase in pain.  -LP     Please refer to paper survey for additional self-reported information Yes  -LP     Rehab Potential Good  -LP     Patient/caregiver participated in establishment of treatment plan and goals Yes  -LP     Patient would benefit from skilled therapy intervention Yes  -LP            PT Plan    PT Frequency 2x/week  -LP     PT Plan Comments Continue with traction if pt wants.  -LP           User Key  (r) = Recorded By, (t) = Taken By, (c) = Cosigned By    Initials Name Provider Type    LP Yoli Paz, PT Physical Therapist                 Modalities      Row Name 02/18/22 0700             Traction 88298    Traction Type Cervical  -LP      PT Traction Rx Minutes 12  -LP      Duration Intermittent  -LP      Position Hook-lying  -LP      Weight 18  8  -LP      Hold 45  -LP      Relax 15  -LP            User Key  (r) = Recorded By, (t) = Taken By, (c) = Cosigned By    Initials Name Provider Type    LP Yoli Paz, PT Physical Therapist               OP Exercises     Row Name 02/18/22 0700             Subjective Comments    Subjective Comments Didn't really see any difference after the traction last visit  -LP              Subjective Pain    Able to rate subjective pain? yes  -LP      Pre-Treatment Pain Level 2  -LP              Exercise 1    Exercise Name 1 UBE  -LP      Cueing 1 Verbal; Demo  -LP      Time 1 4 min  -LP      Additional Comments lat min BW  -LP              Exercise 2    Exercise Name 2 Post shoulder rolls  -LP      Cueing 2 Verbal; Demo  -LP      Reps 2 10  -LP              Exercise 3    Exercise Name 3 Scapular retraction  -LP      Cueing 3 Verbal; Demo  -LP      Reps 3 10  -LP      Additional Comments vc's to be aware of UT activation using mirror for visual cues  -LP              Exercise 4    Exercise Name 4 tband row/extension  -LP      Cueing 4 Verbal; Demo  -LP      Reps 4 10  -LP              Exercise 5    Exercise Name 5 supine on pool noodle  -LP      Time 5 2 minutes  -LP      Additional Comments pec stretch  -LP              Exercise 6    Exercise Name 6 supine on pool noodle  -LP      Cueing 6 Verbal; Demo  -LP      Reps 6 10  -LP      Time 6 alternating flexion  -LP              Exercise 7    Exercise Name 7 supine on pool noodle  -LP      Cueing 7 Verbal; Tactile; Demo  -LP      Reps 7 10  -LP      Time 7 pec opener (open window)  -LP              Exercise 8    Exercise Name 8 SL upper trunk rotation  -LP      Cueing 8 Demo  -LP      Reps 8 10  -LP      Additional Comments B sides  -LP            User Key  (r) = Recorded By, (t)  = Taken By, (c) = Cosigned By    Initials Name Provider Type    LP Yoli Paz, PT Physical Therapist                                 Therapy Education  Education Details: going over lots of ideas for postural awareness at work at home.  Given: HEP, Symptoms/condition management  Program: Reinforced  How Provided: Verbal, Demonstration  Provided to: Patient  Level of Understanding: Teach back education performed              Time Calculation:   Start Time: 0700  Stop Time: 0750  Time Calculation (min): 50 min  Untimed Charges  PT Traction Rx Minutes: 12  Total Minutes  Untimed Charges Total Minutes: 12   Total Minutes: 12  Therapy Charges for Today     Code Description Service Date Service Provider Modifiers Qty    03516803690 HC PT THER PROC EA 15 MIN 2/18/2022 Yoli Paz, PT GP 2    33612821459 HC PT TRACTION CERVICAL 2/18/2022 Yoli Paz, PT GP 1                    Yoli Paz PT  2/18/2022

## 2022-02-21 DIAGNOSIS — M48.02 CERVICAL STENOSIS OF SPINAL CANAL: Primary | ICD-10-CM

## 2022-02-21 NOTE — PROGRESS NOTES
Please let patient know-- MRI of cervical spine is showing some foraminal stenosis. If patient would like, I can refer to neurosurgery. Let me know, thanks.

## 2022-02-25 ENCOUNTER — HOSPITAL ENCOUNTER (OUTPATIENT)
Dept: PHYSICAL THERAPY | Facility: HOSPITAL | Age: 52
Setting detail: THERAPIES SERIES
Discharge: HOME | End: 2022-02-25

## 2022-02-25 DIAGNOSIS — M54.2 NECK PAIN: ICD-10-CM

## 2022-02-25 DIAGNOSIS — M54.12 CERVICAL RADICULOPATHY: Primary | ICD-10-CM

## 2022-02-25 PROCEDURE — 97530 THERAPEUTIC ACTIVITIES: CPT

## 2022-02-25 PROCEDURE — 97110 THERAPEUTIC EXERCISES: CPT

## 2022-02-25 NOTE — THERAPY TREATMENT NOTE
Outpatient Physical Therapy Ortho Treatment Note  [facility]     Patient Name: Viktoria Mukherjee  : 1970  MRN: [id number]  Today's Date: 2022      Visit Date: 2022    Visit Dx:    ICD-10-CM ICD-9-CM   1. Cervical radiculopathy  M54.12 723.4   2. Neck pain  M54.2 723.1       Patient Active Problem List   Diagnosis   • Hypothyroidism, adult   • Allergic rhinitis   • Asthma   • Tic disorder   • Idiopathic hypersomnia with long sleep time   • Chronic fatigue   • Sleep concern   • Bruxism   • Colon cancer screening   • Cervical radiculopathy        Past Medical History:   Diagnosis Date   • Allergic Mold   • Allergic rhinitis    • Arthritis Various joints   • Asthma    • Depression Managed w meds   • Disease of thyroid gland    • Hypothyroidism On meds   • Tic disorder    • Tremor In jaw, on meds        Past Surgical History:   Procedure Laterality Date   •  SECTION  2006   • DIAGNOSTIC LAPAROSCOPY                          PT Assessment/Plan     Row Name 22 4585          PT Assessment    Assessment Comments Pt continues to report soreness and inc neural tension. However, she demonstrates current work positioning and states she is unable to lean against chair back due to dec leg length, therefore, she is sitting in very forced/rigid posture without support and attempting to maintain for long periods of time. Noted T/L junction extension and extended upper cervical spine with this position as well as inc guarding in cervical muscles. Discussed options to improve and pt will look into possible adjustments vs. getting new chair. Encouraged pt to reduce intensity for nearly all exercises today as she is overdoing most scapular strengthening and postural exercises. Continued supine on pool noodle for thoracic and pec opening. Added several strengthening exercises with good tolerance. Held cervical traction today due to apparent lack of benefit. Pt to see neurosurgeon in a few weeks  to discuss recent MRI (results in chart).  -LB            PT Plan    PT Plan Comments Assess workspace set up, continue scapular strengthening.  -LB           User Key  (r) = Recorded By, (t) = Taken By, (c) = Cosigned By    Initials Name Provider Type    Jaquelin Mao, PT Physical Therapist                   OP Exercises     Row Name 02/25/22 1000             Subjective Comments    Subjective Comments I am pretty sore today, muscle sore. I have been working on my posture but I think maybe I am overdoing it. I can't sit with my back against my chair bc my legs are too short.  -LB              Subjective Pain    Able to rate subjective pain? yes  -LB      Pre-Treatment Pain Level 4  -LB              Total Minutes    72016 - PT Therapeutic Exercise Minutes 30  -LB      55205 - PT Therapeutic Activity Minutes 10  -LB              Exercise 1    Exercise Name 1 UBE  -LB      Cueing 1 Verbal; Demo  -LB      Time 1 4 min  -LB              Exercise 2    Exercise Name 2 Post shoulder rolls  -LB      Cueing 2 Verbal; Demo  -LB      Reps 2 10  -LB              Exercise 4    Exercise Name 4 tband row/extension  -LB      Cueing 4 Verbal; Demo  -LB      Reps 4 15  -LB      Time 4 GTB  -LB              Exercise 5    Exercise Name 5 supine on pool noodle  -LB      Time 5 2 minutes  -LB      Additional Comments pec stretch  -LB              Exercise 6    Exercise Name 6 supine on pool noodle  -LB      Cueing 6 Verbal; Demo  -LB      Reps 6 10  -LB      Time 6 alternating flexion  -LB              Exercise 7    Exercise Name 7 supine on pool noodle  -LB      Cueing 7 Verbal; Tactile; Demo  -LB      Reps 7 10  -LB      Time 7 pec opener (open window)  -LB              Exercise 8    Exercise Name 8 SL upper trunk rotation  -LB      Cueing 8 Demo  -LB      Reps 8 10  -LB      Additional Comments B  -LB              Exercise 9    Exercise Name 9 supine on pool noodle  -LB      Reps 9 15  -LB      Time 9 RTB HA  -LB               Exercise 10    Exercise Name 10 supine on pool noodle  -LB      Reps 10 15  -LB      Time 10 RTB B ER  -LB              Exercise 11    Exercise Name 11 supine on pool noodle D2 flexion  -LB      Reps 11 15  -LB      Time 11 RTB  -LB              Exercise 12    Exercise Name 12 SAP  -LB      Reps 12 20  -LB      Time 12 no weight  -LB              Exercise 13    Exercise Name 13 lengthy discussion and demonstration concerning posture at desk, during work day with back against surface or built up surface, 90 deg at elbow  -LB      Time 13 10 minutes  -LB            User Key  (r) = Recorded By, (t) = Taken By, (c) = Cosigned By    Initials Name Provider Type    LB Jaquelin Lebron, PT Physical Therapist                              PT OP Goals     Row Name 02/25/22 1000          PT Short Term Goals    STG Date to Achieve 02/25/22  -LB     STG 1 Patient will demonstrate an independent initial HEP without exacerbation of symptoms.  -LB     STG 1 Progress Met  -LB     STG 2 Patient will be independent in postural awareness during sitting for work, and reaching during functional tasks.  -LB     STG 2 Progress Ongoing  -LB     STG 2 Progress Comments long review today  -LB            Long Term Goals    LTG Date to Achieve 04/01/22  -LB     LTG 1 Patient will demonstrate independence with comprehensive HEP to allow self management of symptoms and decrease risk of recurrence.  -LB     LTG 1 Progress Ongoing  -LB     LTG 2 Patient will demonstrate normal cervical AROM without c/o pain/tightness.  -LB     LTG 2 Progress Ongoing  -LB     LTG 2 Progress Comments inc B hand tingling with cervical extension  -LB     LTG 3 Patient will demonstrate improvement in scapular strength to 4/5 or better (R mid,low trap & rhomboids, L low trap) for improved mechanics.  -LB     LTG 3 Progress Ongoing  -LB     LTG 4 Patient will report 75% or better improvement in occurrance of R UE radicular symptoms.  -LB     LTG 4 Progress Ongoing  -LB            User Key  (r) = Recorded By, (t) = Taken By, (c) = Cosigned By    Initials Name Provider Type    Jaquelin Mao, PT Physical Therapist                Therapy Education  Education Details: lengthy discussion and demonstration/arranging pt on pillows in chair to simulate workplace set up, modifying desktop position, keyboard vs. chair height, assuming appropriate posture and then building surface to meet her body vs. maintaining rigid posture to improve neck position  Given: Symptoms/condition management, HEP, Posture/body mechanics  Program: Reinforced  How Provided: Verbal, Demonstration  Provided to: Patient  Level of Understanding: Teach back education performed, Verbalized, Demonstrated              Time Calculation:   Start Time: 0700  Stop Time: 0745  Time Calculation (min): 45 min  Total Timed Code Minutes- PT: 43 minute(s)  Timed Charges  80593 - PT Therapeutic Exercise Minutes: 30  74439 - PT Therapeutic Activity Minutes: 10  Total Minutes  Timed Charges Total Minutes: 40   Total Minutes: 40  Therapy Charges for Today     Code Description Service Date Service Provider Modifiers Qty    48423927077 HC PT THER PROC EA 15 MIN 2/25/2022 Jaquelin Lebron, PT GP 2    48205553120 HC PT THERAPEUTIC ACT EA 15 MIN 2/25/2022 Jaquelin Lebron, PT GP 1                    Jaquelin Lebron PT  2/25/2022

## 2022-03-04 ENCOUNTER — HOSPITAL ENCOUNTER (OUTPATIENT)
Dept: PHYSICAL THERAPY | Facility: HOSPITAL | Age: 52
Setting detail: THERAPIES SERIES
Discharge: HOME | End: 2022-03-04

## 2022-03-04 DIAGNOSIS — M54.12 CERVICAL RADICULOPATHY: Primary | ICD-10-CM

## 2022-03-04 DIAGNOSIS — M54.2 NECK PAIN: ICD-10-CM

## 2022-03-04 PROCEDURE — 97012 MECHANICAL TRACTION THERAPY: CPT

## 2022-03-04 PROCEDURE — 97110 THERAPEUTIC EXERCISES: CPT

## 2022-03-04 NOTE — THERAPY TREATMENT NOTE
Outpatient Physical Therapy Ortho Treatment Note  [facility]     Patient Name: Viktoria Mukherjee  : 1970  MRN: [id number]  Today's Date: 3/4/2022      Visit Date: 2022    Visit Dx:    ICD-10-CM ICD-9-CM   1. Cervical radiculopathy  M54.12 723.4   2. Neck pain  M54.2 723.1       Patient Active Problem List   Diagnosis   • Hypothyroidism, adult   • Allergic rhinitis   • Asthma   • Tic disorder   • Idiopathic hypersomnia with long sleep time   • Chronic fatigue   • Sleep concern   • Bruxism   • Colon cancer screening   • Cervical radiculopathy        Past Medical History:   Diagnosis Date   • Allergic Mold   • Allergic rhinitis    • Arthritis Various joints   • Asthma    • Depression Managed w meds   • Disease of thyroid gland    • Hypothyroidism On meds   • Tic disorder    • Tremor In jaw, on meds        Past Surgical History:   Procedure Laterality Date   •  SECTION  2006   • DIAGNOSTIC LAPAROSCOPY                          PT Assessment/Plan     Row Name 22 0790          PT Assessment    Assessment Comments Pt with much improved postural awarenss and has modified her desk position at work. She is much more aware of what muscles she is using during workouts and has modified some exercises to reduce UT activation. Performed most exercises today in supine on mat table without pillow to maximize pec opening. Pt reporting inc tension in pecs and UT but did workout yesterday involving inc chest press and lateral raise with palm down and weight in each hand. Requested cervical traction to attempt to reduce.  -LB            PT Plan    PT Plan Comments Assess response to workplace adjustment, travel, modification of exercise classes.  -LB           User Key  (r) = Recorded By, (t) = Taken By, (c) = Cosigned By    Initials Name Provider Type    Jaquelin Mao, PT Physical Therapist                 Modalities     Row Name 22 0700             Traction 15042    Traction Type  Cervical  -LB      PT Traction Rx Minutes 12  -LB      Duration Intermittent  -LB      Position Hook-lying  -LB      Weight 18  8  -LB      Hold 45  -LB      Relax 15  -LB            User Key  (r) = Recorded By, (t) = Taken By, (c) = Cosigned By    Initials Name Provider Type    LB Jaquelin Lebron, PT Physical Therapist               OP Exercises     Row Name 03/04/22 0700             Subjective Comments    Subjective Comments I think I am doing better. My desk set up is better. I have been doing my workout class and I think I am working my upper trap too much.  -LB              Subjective Pain    Able to rate subjective pain? yes  -LB      Pre-Treatment Pain Level 3  -LB              Total Minutes    81553 - PT Therapeutic Exercise Minutes 30  -LB              Exercise 1    Exercise Name 1 pulleys  -LB      Cueing 1 --  -LB      Reps 1 15  -LB      Time 1 scaption  -LB              Exercise 2    Exercise Name 2 Post shoulder rolls  -LB      Cueing 2 Verbal; Demo  -LB      Reps 2 10  -LB              Exercise 4    Exercise Name 4 tband row/extension  -LB      Cueing 4 Verbal; Demo  -LB      Reps 4 15  -LB      Time 4 GTB  -LB              Exercise 5    Exercise Name 5 supine snow mynor  -LB      Reps 5 10  -LB      Time 5 no pillow  -LB              Exercise 6    Exercise Name 6 supine on mat table  -LB      Cueing 6 Verbal; Demo  -LB      Sets 6 2  -LB      Reps 6 10  -LB      Time 6 HA  -LB              Exercise 7    Exercise Name 7 supine on mat table  -LB      Cueing 7 Verbal; Tactile; Demo  -LB      Reps 7 10  -LB      Time 7 RTB at wrists  -LB              Exercise 8    Exercise Name 8 SL upper trunk rotation  -LB      Cueing 8 Demo  -LB      Reps 8 10  -LB              Exercise 9    Exercise Name 9 supine on mat table  -LB      Sets 9 2  -LB      Reps 9 10  -LB      Time 9 3#  -LB      Additional Comments SAP  -LB              Exercise 10    Exercise Name 10 supine chin tuck  -LB      Reps 10 10  -LB      Time  10 5  -LB              Exercise 11    Exercise Name 11 --  -LB      Reps 11 --  -LB      Time 11 --  -LB              Exercise 12    Exercise Name 12 --  -LB      Reps 12 --  -LB      Time 12 --  -LB              Exercise 13    Exercise Name 13 --  -LB      Time 13 --  -LB            User Key  (r) = Recorded By, (t) = Taken By, (c) = Cosigned By    Initials Name Provider Type    Jaquelin Mao, PT Physical Therapist                              PT OP Goals     Row Name 03/04/22 0700          PT Short Term Goals    STG Date to Achieve 02/25/22  -LB     STG 1 Patient will demonstrate an independent initial HEP without exacerbation of symptoms.  -LB     STG 1 Progress Met  -LB     STG 2 Patient will be independent in postural awareness during sitting for work, and reaching during functional tasks.  -LB     STG 2 Progress Progressing  -LB     STG 2 Progress Comments much improved desk position and awareness of posture  -LB            Long Term Goals    LTG Date to Achieve 04/01/22  -LB     LTG 1 Patient will demonstrate independence with comprehensive HEP to allow self management of symptoms and decrease risk of recurrence.  -LB     LTG 1 Progress Ongoing  -LB     LTG 2 Patient will demonstrate normal cervical AROM without c/o pain/tightness.  -LB     LTG 2 Progress Ongoing  -LB     LTG 3 Patient will demonstrate improvement in scapular strength to 4/5 or better (R mid,low trap & rhomboids, L low trap) for improved mechanics.  -LB     LTG 3 Progress Ongoing  -LB     LTG 4 Patient will report 75% or better improvement in occurrance of R UE radicular symptoms.  -LB     LTG 4 Progress Ongoing  -LB           User Key  (r) = Recorded By, (t) = Taken By, (c) = Cosigned By    Initials Name Provider Type    Jaquelin Mao, PT Physical Therapist                               Time Calculation:   Start Time: 0700  Stop Time: 0745  Time Calculation (min): 45 min  Total Timed Code Minutes- PT: 30 minute(s)  Timed Charges  46352  - PT Therapeutic Exercise Minutes: 30  Untimed Charges  PT Traction Rx Minutes: 12  Total Minutes  Timed Charges Total Minutes: 30  Untimed Charges Total Minutes: 12   Total Minutes: 42  Therapy Charges for Today     Code Description Service Date Service Provider Modifiers Qty    40310344657 HC PT THER PROC EA 15 MIN 3/4/2022 Jaquelin Lebron, PT GP 2    17222885947 HC PT TRACTION CERVICAL 3/4/2022 Jaquelin Lebron, PT GP 1                    Jaquelin Lebron, PT  3/4/2022

## 2022-03-09 NOTE — PROGRESS NOTES
"Subjective   History of Present Illness: Viktoria Mukherjee is a 51 y.o. female is being seen for consultation today at the request of ROSALIA Bishop for intermittent neck pain that radiates into the right arm with tingling that began about 2-3 years ago. She reports that the pain went away and then came back in November of 2021 all without event. She has tried physical therapy (including traction both in therapy at home) with some success but still has sensations going into the second and third digits of the right hand.  She still gets episodic right arm pain.  She also gets unusual pain over the right TMJ joint.    While in the room and during my examination of the patient I wore a mask and eye protection.  I washed my hands before and after this patient encounter.  The patient was also wearing a mask.    Neck Pain   The current episode started more than 1 year ago. The problem occurs intermittently. The problem has been gradually worsening. The pain is present in the left side and right side. The quality of the pain is described as aching and stabbing. The symptoms are aggravated by position. Associated symptoms include tingling. Pertinent negatives include no numbness or weakness. Treatments tried: physical therapy.       The following portions of the patient's history were reviewed and updated as appropriate: allergies, current medications, past family history, past medical history, past social history, past surgical history and problem list.    Review of Systems   Constitutional: Positive for activity change.   Musculoskeletal: Positive for neck pain.   Neurological: Positive for tingling. Negative for weakness and numbness.   Psychiatric/Behavioral: Negative for sleep disturbance.       Objective     Vitals:    03/15/22 0926   BP: 144/88   Pulse: 77   Temp: 97.7 °F (36.5 °C)   SpO2: 100%   Weight: 72.6 kg (160 lb)   Height: 152.4 cm (60\")     Body mass index is 31.25 kg/m².      Physical Exam  Neurologic " Exam    Physical Exam:    CONSTITUTIONAL: This 51 year old right handed  female appears well developed, well-nourished and in no acute distress.    HEAD & FACE: the head and face are symmetric, normocephalic and atraumatic.  There is no tenderness of the TMJ joints.    EYES: Inspection of the conjunctivae and lids reveals no swelling, erythema or discharge.  Pupils are round, equal and reactive to light and there is no scleral icterus.    EARS, NOSE, MOUTH & THROAT: On inspection, the ears and nose are within normal limits.    NECK: the neck is supple and symmetric. The trachea is midline with no masses.  Some discomfort in the neck with extension but good motion of the neck.  Spurling's maneuver is negative    PULMONARY: Respiratory effort is normal with no increased work of breathing or signs of respiratory distress.    CARDIOVASCULAR: Pedal pulses are +2/4 bilaterally. Examination of the extremities shows no edema or varicosities.    LYMPHATIC: There is no palpable lymphadenopathy of the neck.    MUSCULOSKELETAL: Gait and station are within normal limits. The spine has normal alignment and range of motion.    SKIN: The skin is warm, dry and intact    NEUROLOGIC:   Cranial Nerves 2-12 intact  Normal motor strength noted. Muscle bulk and tone are normal.  Sensory exam is normal to fine touch to confrontational testing bilaterally  Reflexes on the right side demonstrates 1/4 Triceps Reflex, 2/4 Biceps Reflex, 2/4 Brachioradialis Reflex on the right.   Reflexes on the left side demonstrates 2/4 Triceps Reflex, 2/4 Biceps Reflex, 2/4 Brachioradialis Reflex on the left.  Superficial/Primitive Reflexes: primitive reflexes were absent.  Valladares's, Babinski, and Clonus signs all negative.  No coordination deficit observed.  Radicular testing showed a negative Spurling's maneuver  Cortical function is intact and without deficits. Speech is normal.    PSYCHIATRIC: oriented to person, place and time. Patient's mood  and affect are normal.    Assessment/Plan   Independent Review of Radiographic Studies:      I personally reviewed the images from the following studies.    MRI of the cervical spine done on February 16, 2022 at [facility] reveals left neuroforaminal narrowing at C4-C5 due to uncovertebral joint hypertrophy.  There is moderate to severe right neuroforaminal narrowing at C6-C7 due to to uncovertebral joint hypertrophy and protrusion of the disc.  Since the MRI of 2019 in the [facility] the C6-C7 changes are new.    Medical Decision Making:      She has clinical and radiographic signs of C7 radiculopathy in the right upper extremity although she is somewhat improved over time.  Still persist and therefore looking at an MRI that shows a new and worsening issue at the C6-C7 level to the right that correlates with her symptoms she has 2 major options.  She could consider interventional pain management with steroid injections on the neck versus an anterior cervical discectomy and fusion at C6-C7.  She would like to try to maintain the most conservative approach and would like to consult with a pain management specialist regarding the option of interventional pain management injections.  If she does not respond favorably or the arm pain recurs severely she should follow-up with us to consider anterior cervical discectomy and fusion at C6-C7    Return if symptoms worsen or fail to improve.    Diagnoses and all orders for this visit:    1. Herniation of cervical intervertebral disc with radiculopathy (Primary)  -     Ambulatory Referral to Pain Management             eFliciano Olivera MD FACS FAANS  Neurological Surgery

## 2022-03-15 ENCOUNTER — OFFICE VISIT (OUTPATIENT)
Dept: NEUROSURGERY | Facility: CLINIC | Age: 52
End: 2022-03-15

## 2022-03-15 VITALS
DIASTOLIC BLOOD PRESSURE: 88 MMHG | BODY MASS INDEX: 31.41 KG/M2 | WEIGHT: 160 LBS | SYSTOLIC BLOOD PRESSURE: 144 MMHG | TEMPERATURE: 97.7 F | HEART RATE: 77 BPM | HEIGHT: 60 IN | OXYGEN SATURATION: 100 %

## 2022-03-15 DIAGNOSIS — M50.10 HERNIATION OF CERVICAL INTERVERTEBRAL DISC WITH RADICULOPATHY: Primary | ICD-10-CM

## 2022-03-15 PROCEDURE — 99204 OFFICE O/P NEW MOD 45 MIN: CPT | Performed by: NEUROLOGICAL SURGERY

## 2022-03-18 ENCOUNTER — HOSPITAL ENCOUNTER (OUTPATIENT)
Dept: PHYSICAL THERAPY | Facility: HOSPITAL | Age: 52
Setting detail: THERAPIES SERIES
Discharge: HOME | End: 2022-03-18

## 2022-03-18 DIAGNOSIS — M54.12 CERVICAL RADICULOPATHY: Primary | ICD-10-CM

## 2022-03-18 DIAGNOSIS — M54.2 NECK PAIN: ICD-10-CM

## 2022-03-18 PROCEDURE — 97140 MANUAL THERAPY 1/> REGIONS: CPT

## 2022-03-18 PROCEDURE — 97110 THERAPEUTIC EXERCISES: CPT

## 2022-03-18 NOTE — THERAPY PROGRESS REPORT/RE-CERT
Outpatient Physical Therapy Ortho Progress Note  [facility]     Patient Name: Viktoria Mukherjee  : 1970  MRN: [id number]  Today's Date: 3/18/2022      Visit Date: 2022    Visit Dx:    ICD-10-CM ICD-9-CM   1. Cervical radiculopathy  M54.12 723.4   2. Neck pain  M54.2 723.1       Patient Active Problem List   Diagnosis   • Hypothyroidism, adult   • Allergic rhinitis   • Asthma   • Tic disorder   • Idiopathic hypersomnia with long sleep time   • Chronic fatigue   • Sleep concern   • Bruxism   • Colon cancer screening   • Herniation of cervical intervertebral disc with radiculopathy        Past Medical History:   Diagnosis Date   • Allergic Mold   • Allergic rhinitis    • Arthritis Various joints   • Asthma    • Depression Managed w meds   • Disease of thyroid gland    • Hypothyroidism On meds   • Tic disorder    • Tremor In jaw, on meds        Past Surgical History:   Procedure Laterality Date   •  SECTION  2006   • DIAGNOSTIC LAPAROSCOPY                          PT Assessment/Plan     Row Name 22 0744          PT Assessment    Functional Limitations Limitation in home management;Limitations in functional capacity and performance  -     Impairments Impaired flexibility;Muscle strength;Pain;Posture;Range of motion  -     Assessment Comments Viktoria Mukherjee has been seen for 6 physical therapy sessions for cervical radiculopathy with primary complaints of neck pain and R paresthesias. Treatment has included therapeutic exercise, manual therapy, therapeutic activity, patient education with home exercise program  and mechanical cervical traction. Progress to physical therapy goals is fair as pt. Has met 2/2 STGs, and 0/4 LTGs with progress toward remaining goals. She reports improved pain levels since December but over last ~4 weeks has not noticed much change with possibly more consistent paresthesias into R UE. Pt. Has met with neurosurgeon with recommendations to consider  interventional pain management with steroid injections on the neck versus an anterior cervical discectomy and fusion at C6-C7. At this time pt. Has elected to continue with conservative management and awaiting scheduling for injections. Pt. Has modified desk set up for more optimal ergonomics and has verbalized improved postural awareness though intermittent cues/education on purpose of exercise throughout session. She will benefit from continued skilled physical therapy to address remaining impairments and functional limitations.  -     Rehab Potential Good  -     Patient/caregiver participated in establishment of treatment plan and goals Yes  -     Patient would benefit from skilled therapy intervention Yes  -            PT Plan    PT Frequency 2x/week  -     Predicted Duration of Therapy Intervention (PT) 6 visits  -     Planned CPT's? PT RE-EVAL: 10089;PT THER PROC EA 15 MIN: 28299;PT THER ACT EA 15 MIN: 68266;PT MANUAL THERAPY EA 15 MIN: 57228;PT TRACTION CERVICAL: 76462  -     PT Plan Comments has pt. been scheduled for injection? was manual beneficial?  -           User Key  (r) = Recorded By, (t) = Taken By, (c) = Cosigned By    Initials Name Provider Type     Roseanna Spears, PT Physical Therapist                   OP Exercises     Row Name 03/18/22 0700             Subjective Comments    Subjective Comments I haven't noticed any real improvement to be honest, I mean it's better since December but over the past month it is the same, maybe even more consistent  -              Total Minutes    75115 - PT Therapeutic Exercise Minutes 30  -      45664 - PT Manual Therapy Minutes 10  -              Exercise 1    Exercise Name 1 UBE  -      Cueing 1 Verbal  -      Time 1 2 min  -              Exercise 3    Exercise Name 3 pulleys  -      Cueing 3 Verbal;Demo  -      Reps 3 15  -              Exercise 4    Exercise Name 4 tband row/extension  -      Cueing 4 Verbal;Demo  -       Reps 4 15  -MH      Time 4 GTB  -              Exercise 6    Exercise Name 6 supine on mat table  -MH      Cueing 6 Verbal;Demo  -      Sets 6 1e  -MH      Reps 6 15  -MH      Time 6 HA and B ER  -      Additional Comments RTB  -MH              Exercise 7    Exercise Name 7 supine on mat table  -MH      Cueing 7 Verbal;Tactile;Demo  -MH      Reps 7 10  -MH      Time 7 RTB at wrists  -MH              Exercise 8    Exercise Name 8 SL upper trunk rotation  -      Cueing 8 Demo  -MH      Reps 8 10  -MH              Exercise 9    Exercise Name 9 supine on mat table  -MH      Cueing 9 Demo  -MH      Sets 9 2  -MH      Reps 9 10  -MH      Time 9 3#  -MH      Additional Comments SAP  -            User Key  (r) = Recorded By, (t) = Taken By, (c) = Cosigned By    Initials Name Provider Type     Roseanna Spears, PT Physical Therapist                         Manual Rx (last 36 hours)     Manual Treatments     Row Name 03/18/22 0700             Total Minutes    43485 - PT Manual Therapy Minutes 10  -              Manual Rx 1    Manual Rx 1 Location cx upglides/sideglides for R opening  -      Manual Rx 1 Type gentle manual distraction  -      Manual Rx 1 Grade STM UT/LS/pec  -            User Key  (r) = Recorded By, (t) = Taken By, (c) = Cosigned By    Initials Name Provider Type     Roseanna Spears, PT Physical Therapist                 PT OP Goals     Row Name 03/18/22 0700          PT Short Term Goals    STG Date to Achieve 02/25/22  -     STG 1 Patient will demonstrate an independent initial HEP without exacerbation of symptoms.  -     STG 1 Progress Met  -     STG 2 Patient will be independent in postural awareness during sitting for work, and reaching during functional tasks.  -     STG 2 Progress Met  -     STG 2 Progress Comments has modified desk set up, becoming more aware of posture throughout the day.  -            Long Term Goals    LTG Date to Achieve 04/01/22  -     LTG 1  Patient will demonstrate independence with comprehensive HEP to allow self management of symptoms and decrease risk of recurrence.  -     LTG 1 Progress Ongoing;Progressing  -     LTG 2 Patient will demonstrate normal cervical AROM without c/o pain/tightness.  -     LTG 2 Progress Ongoing;Progressing  -     LTG 2 Progress Comments still feels tight/restricted with cervical ROM  -     LTG 3 Patient will demonstrate improvement in scapular strength to 4/5 or better (R mid,low trap & rhomboids, L low trap) for improved mechanics.  -     LTG 3 Progress Ongoing;Progressing  -     LTG 4 Patient will report 75% or better improvement in occurrance of R UE radicular symptoms.  -     LTG 4 Progress Ongoing;Progressing  -     LTG 4 Progress Comments at least 50% improvement since December, over past month no real improvement in paresthesia but pain still reduced.  -           User Key  (r) = Recorded By, (t) = Taken By, (c) = Cosigned By    Initials Name Provider Type     Roseanna Spears PT Physical Therapist                Therapy Education  Education Details: Continued discussion regarding postural awareness, purpose of exercises  Given: HEP, Symptoms/condition management  Program: Reinforced  How Provided: Verbal, Demonstration  Provided to: Patient  Level of Understanding: Verbalized, Demonstrated              Time Calculation:   Start Time: 0701  Stop Time: 0741  Time Calculation (min): 40 min  Total Timed Code Minutes- PT: 40 minute(s)  Timed Charges  99406 - PT Therapeutic Exercise Minutes: 30  14021 - PT Manual Therapy Minutes: 10  Total Minutes  Timed Charges Total Minutes: 40   Total Minutes: 40  Therapy Charges for Today     Code Description Service Date Service Provider Modifiers Qty    31465626091  PT MANUAL THERAPY EA 15 MIN 3/18/2022 Roseanna Spears, PT GP 1    [id number]  PT THER PROC EA 15 MIN 3/18/2022 Roseanna Spears PT GP 2                    Roseanna Spears PT  3/18/2022

## 2022-03-21 ENCOUNTER — PATIENT ROUNDING (BHMG ONLY) (OUTPATIENT)
Dept: NEUROSURGERY | Facility: CLINIC | Age: 52
End: 2022-03-21

## 2022-04-01 ENCOUNTER — HOSPITAL ENCOUNTER (OUTPATIENT)
Dept: PHYSICAL THERAPY | Facility: HOSPITAL | Age: 52
Setting detail: THERAPIES SERIES
Discharge: HOME | End: 2022-04-01

## 2022-04-01 DIAGNOSIS — M54.2 NECK PAIN: ICD-10-CM

## 2022-04-01 DIAGNOSIS — M54.12 CERVICAL RADICULOPATHY: Primary | ICD-10-CM

## 2022-04-01 PROCEDURE — 97530 THERAPEUTIC ACTIVITIES: CPT

## 2022-04-01 PROCEDURE — 97140 MANUAL THERAPY 1/> REGIONS: CPT

## 2022-04-01 NOTE — THERAPY DISCHARGE NOTE
Outpatient Physical Therapy Ortho Treatment Note/Discharge Summary  [facility]     Patient Name: Viktoria Mukherjee  : 1970  MRN: [id number]  Today's Date: 2022      Visit Date: 2022    Visit Dx:    ICD-10-CM ICD-9-CM   1. Cervical radiculopathy  M54.12 723.4   2. Neck pain  M54.2 723.1       Patient Active Problem List   Diagnosis   • Hypothyroidism, adult   • Allergic rhinitis   • Asthma   • Tic disorder   • Idiopathic hypersomnia with long sleep time   • Chronic fatigue   • Sleep concern   • Bruxism   • Colon cancer screening   • Herniation of cervical intervertebral disc with radiculopathy        Past Medical History:   Diagnosis Date   • Allergic Mold   • Allergic rhinitis    • Arthritis Various joints   • Asthma    • Depression Managed w meds   • Disease of thyroid gland    • Hypothyroidism On meds   • Tic disorder    • Tremor In jaw, on meds        Past Surgical History:   Procedure Laterality Date   •  SECTION  2006   • DIAGNOSTIC LAPAROSCOPY                          PT Assessment/Plan     Row Name 22 0755          PT Assessment    Assessment Comments Pt has participated in 7 skilled PT sessions for cervical pain and R radicular symptoms. She initially made good progress with skilled PT services and is notably improved in her postural awareness, strength, and modifications of workouts to reduce cervical strain and improve alignment. At this point she appears to have reached plateau in improvement and denies change in condition/possibly worsening of symptoms over last 3 weeks. She has had neuro consult and is waiting to schedule KORIN. She is compliant with HEP and will attempt indepenent management as she pursues other means for symptoms management. She knows she can return to skilled PT if symptoms improve or she has any remaining issues or concerns.  -LB            PT Plan    PT Plan Comments d/c to independent management  -LB           User Key  (r) = Recorded  By, (t) = Taken By, (c) = Cosigned By    Initials Name Provider Type    LB Jaquelin Lebron, PT Physical Therapist                     OP Exercises     Row Name 04/01/22 0700             Subjective Comments    Subjective Comments I feel about the same to slightly worse. I just feel a general annoyance that is aching through the back of my neck and the tingling in my hands is maybe more frequent than when I started. I am planning to get an injection but it has not been scheduled yet.  -LB              Subjective Pain    Able to rate subjective pain? yes  -LB      Pre-Treatment Pain Level 3  -LB              Total Minutes    72636 - PT Therapeutic Activity Minutes 15  -LB      69992 - PT Manual Therapy Minutes 30  -LB              Exercise 1    Exercise Name 1 UBE  -LB      Cueing 1 Verbal  -LB      Time 1 2 minutes  -LB              Exercise 2    Exercise Name 2 pulleys  -LB      Reps 2 15  -LB              Exercise 3    Exercise Name 3 reviewed posture at home, desk space, in sitting  -LB      Time 3 8 minutes  -LB            User Key  (r) = Recorded By, (t) = Taken By, (c) = Cosigned By    Initials Name Provider Type    Jaquelin Mao, PT Physical Therapist                           Manual Rx (last 36 hours)     Manual Treatments     Row Name 04/01/22 0700             Total Minutes    42268 - PT Manual Therapy Minutes 30  -LB              Manual Rx 1    Manual Rx 1 Location cx upglides/sideglides for R opening  -LB      Manual Rx 1 Type gentle manual distraction  -LB      Manual Rx 1 Grade STM UT/LS/pec  -LB            User Key  (r) = Recorded By, (t) = Taken By, (c) = Cosigned By    Initials Name Provider Type    Jaquelin Mao, PT Physical Therapist                 PT OP Goals     Row Name 04/01/22 0700          PT Short Term Goals    STG Date to Achieve 02/25/22  -LB     STG 1 Patient will demonstrate an independent initial HEP without exacerbation of symptoms.  -LB     STG 1 Progress Met  -LB     STG 2 Patient  will be independent in postural awareness during sitting for work, and reaching during functional tasks.  -LB     STG 2 Progress Met  -LB            Long Term Goals    LTG Date to Achieve 04/01/22  -LB     LTG 1 Patient will demonstrate independence with comprehensive HEP to allow self management of symptoms and decrease risk of recurrence.  -LB     LTG 1 Progress Met  -LB     LTG 2 Patient will demonstrate normal cervical AROM without c/o pain/tightness.  -LB     LTG 2 Progress Not Met  -LB     LTG 3 Patient will demonstrate improvement in scapular strength to 4/5 or better (R mid,low trap & rhomboids, L low trap) for improved mechanics.  -LB     LTG 3 Progress Met  -LB     LTG 4 Patient will report 75% or better improvement in occurrance of R UE radicular symptoms.  -LB     LTG 4 Progress Not Met  -LB           User Key  (r) = Recorded By, (t) = Taken By, (c) = Cosigned By    Initials Name Provider Type    Jaquelin Mao, PT Physical Therapist                Therapy Education  Given: Symptoms/condition management, HEP, Mobility training, Posture/body mechanics  Program: Reinforced  How Provided: Verbal, Demonstration  Provided to: Patient  Level of Understanding: Teach back education performed, Verbalized, Demonstrated              Time Calculation:   Start Time: 0700  Stop Time: 0745  Time Calculation (min): 45 min  Total Timed Code Minutes- PT: 42 minute(s)  Timed Charges  75462 - PT Manual Therapy Minutes: 30  67060 - PT Therapeutic Activity Minutes: 15  Total Minutes  Timed Charges Total Minutes: 45   Total Minutes: 45  Therapy Charges for Today     Code Description Service Date Service Provider Modifiers Qty    19408694808 HC PT MANUAL THERAPY EA 15 MIN 4/1/2022 Jaquelin Lebron, PT GP 2    68186926649 HC PT THERAPEUTIC ACT EA 15 MIN 4/1/2022 Jaquelin Lebron, PT GP 1                OP PT Discharge Summary  Date of Discharge: 04/01/22  Reason for Discharge: Independent, Lack of progress  Outcomes Achieved:  Patient able to partially acheive established goals  Discharge Destination: Home with home program  Discharge Instructions/Additional Comments: see assessment      Jaquelin Lebron, PT  4/1/2022

## 2022-04-26 ENCOUNTER — OFFICE VISIT (OUTPATIENT)
Dept: INTERNAL MEDICINE | Facility: CLINIC | Age: 52
End: 2022-04-26

## 2022-04-26 VITALS
TEMPERATURE: 97.3 F | OXYGEN SATURATION: 96 % | WEIGHT: 157 LBS | DIASTOLIC BLOOD PRESSURE: 83 MMHG | HEART RATE: 67 BPM | HEIGHT: 60 IN | BODY MASS INDEX: 30.82 KG/M2 | SYSTOLIC BLOOD PRESSURE: 123 MMHG

## 2022-04-26 DIAGNOSIS — J30.9 ALLERGIC RHINITIS: ICD-10-CM

## 2022-04-26 DIAGNOSIS — R03.0 ELEVATED BP WITHOUT DIAGNOSIS OF HYPERTENSION: Primary | ICD-10-CM

## 2022-04-26 DIAGNOSIS — J45.909 MILD ASTHMA WITHOUT COMPLICATION, UNSPECIFIED WHETHER PERSISTENT: ICD-10-CM

## 2022-04-26 PROCEDURE — 99213 OFFICE O/P EST LOW 20 MIN: CPT | Performed by: FAMILY MEDICINE

## 2022-04-26 RX ORDER — MONTELUKAST SODIUM 10 MG/1
10 TABLET ORAL DAILY
Qty: 30 TABLET | Refills: 8 | Status: SHIPPED | OUTPATIENT
Start: 2022-04-26

## 2022-04-26 RX ORDER — GUANFACINE 2 MG/1
TABLET ORAL
COMMUNITY
Start: 2022-02-15

## 2022-04-26 RX ORDER — CETIRIZINE HYDROCHLORIDE 10 MG/1
10 TABLET ORAL DAILY
COMMUNITY

## 2022-04-26 RX ORDER — IBUPROFEN 200 MG
1 CAPSULE ORAL DAILY
COMMUNITY

## 2022-05-01 PROBLEM — R03.0 ELEVATED BP WITHOUT DIAGNOSIS OF HYPERTENSION: Status: ACTIVE | Noted: 2022-05-01

## 2022-05-01 NOTE — ASSESSMENT & PLAN NOTE
No previous history of hypertension. Elevated BP when tried to stop Tenex. Restarted medication  Pt advise to monitor sodium intake. Goal to work on diet and weight loss.  Try to stop at a later date, when we can monitor BP closely.

## 2022-08-31 ENCOUNTER — APPOINTMENT (OUTPATIENT)
Dept: WOMENS IMAGING | Facility: HOSPITAL | Age: 52
End: 2022-08-31

## 2022-08-31 PROCEDURE — 77067 SCR MAMMO BI INCL CAD: CPT | Performed by: RADIOLOGY

## 2022-08-31 PROCEDURE — 77063 BREAST TOMOSYNTHESIS BI: CPT | Performed by: RADIOLOGY

## 2022-09-10 DIAGNOSIS — R92.8 ABNORMAL MAMMOGRAM: Primary | ICD-10-CM

## 2022-09-21 NOTE — TELEPHONE ENCOUNTER
Rx Refill Note  Requested Prescriptions     Pending Prescriptions Disp Refills   • Dulera 100-5 MCG/ACT inhaler [Pharmacy Med Name: DULERA 100 MCG-5 MCG INHALER] 13 g 2     Sig: INHALE TWO PUFFS BY MOUTH TWICE A DAY AS NEEDED      Last office visit with prescribing clinician: 4/26/2022      Next office visit with prescribing clinician: 10/28/2022            Verna Bull MA  09/21/22, 09:53 EDT

## 2022-09-28 ENCOUNTER — APPOINTMENT (OUTPATIENT)
Dept: WOMENS IMAGING | Facility: HOSPITAL | Age: 52
End: 2022-09-28

## 2022-09-28 PROCEDURE — 76642 ULTRASOUND BREAST LIMITED: CPT | Performed by: RADIOLOGY

## 2022-09-28 PROCEDURE — 77061 BREAST TOMOSYNTHESIS UNI: CPT | Performed by: RADIOLOGY

## 2022-09-28 PROCEDURE — G0279 TOMOSYNTHESIS, MAMMO: HCPCS | Performed by: RADIOLOGY

## 2022-09-28 PROCEDURE — 77065 DX MAMMO INCL CAD UNI: CPT | Performed by: RADIOLOGY

## 2022-09-30 ENCOUNTER — LAB (OUTPATIENT)
Dept: LAB | Facility: HOSPITAL | Age: 52
End: 2022-09-30

## 2022-09-30 ENCOUNTER — OFFICE VISIT (OUTPATIENT)
Dept: SLEEP MEDICINE | Facility: HOSPITAL | Age: 52
End: 2022-09-30

## 2022-09-30 VITALS
OXYGEN SATURATION: 99 % | HEART RATE: 60 BPM | SYSTOLIC BLOOD PRESSURE: 143 MMHG | WEIGHT: 154 LBS | BODY MASS INDEX: 30.23 KG/M2 | DIASTOLIC BLOOD PRESSURE: 89 MMHG | HEIGHT: 60 IN

## 2022-09-30 DIAGNOSIS — Z51.81 MEDICATION MONITORING ENCOUNTER: ICD-10-CM

## 2022-09-30 DIAGNOSIS — Z51.81 MEDICATION MONITORING ENCOUNTER: Primary | ICD-10-CM

## 2022-09-30 DIAGNOSIS — G47.11 IDIOPATHIC HYPERSOMNIA WITH LONG SLEEP TIME: ICD-10-CM

## 2022-09-30 PROCEDURE — G0463 HOSPITAL OUTPT CLINIC VISIT: HCPCS

## 2022-09-30 PROCEDURE — 80307 DRUG TEST PRSMV CHEM ANLYZR: CPT

## 2022-09-30 RX ORDER — MODAFINIL 200 MG/1
200 TABLET ORAL DAILY
Qty: 30 TABLET | Refills: 2 | Status: SHIPPED | OUTPATIENT
Start: 2022-09-30

## 2022-09-30 NOTE — PROGRESS NOTES
[facility]  Telephone: [phone] / Fax: [phone] [place]  Telephone: [phone] / Fax: [phone] Gaby Mirza    PCP: Audrey Chew MD    Reason for visit: hypersomnia f/u    Viktoria Mukherjee is a 51 y.o.female  was last seen at [facility] sleep lab in Feb 2022 by Dr Bowden. She has been seeing him for hypersomnia. She does not have YOSEF. She takes 200mg provigil, 1/2 tab in am, and 1/2 around lunch time.  She feels that the medication is very effective. She is not feeling over stimulated. She does not fall asleep at stop lights as she did before. She sleeps well. She gets only occasional palpitations in am, but it quickly resolves.      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    ROS- +post nasal drip, rest is negative    Current Medications:    Current Outpatient Medications:   •  calcium citrate (CALCITRATE) 950 (200 Ca) MG tablet, Take 1 tablet by mouth Daily., Disp: , Rfl:   •  cetirizine (zyrTEC) 10 MG tablet, Take 10 mg by mouth Daily., Disp: , Rfl:   •  desvenlafaxine (PRISTIQ) 100 MG 24 hr tablet, , Disp: , Rfl:   •  Dulera 100-5 MCG/ACT inhaler, INHALE TWO PUFFS BY MOUTH TWICE A DAY AS NEEDED, Disp: 13 g, Rfl: 2  •  guanFACINE (TENEX) 2 MG tablet, , Disp: , Rfl:   •  levothyroxine (SYNTHROID, LEVOTHROID) 88 MCG tablet, Take 88 mcg by mouth Daily., Disp: , Rfl:   •  magnesium oxide (MAGOX) 400 (241.3 Mg) MG tablet tablet, Take 400 mg by mouth Daily., Disp: , Rfl:   •  modafinil (PROVIGIL) 200 MG tablet, Take 1 tablet by mouth Daily., Disp: 30 tablet, Rfl: 5  •  montelukast (SINGULAIR) 10 MG tablet, Take 1 tablet by mouth Daily., Disp: 30 tablet, Rfl: 8  •  Multiple Vitamin (MULTI VITAMIN DAILY) tablet, Take  by mouth., Disp: , Rfl:   •  NON FORMULARY, Testosterone vaginal tablet, Disp: , Rfl:   •  Omega-3 Fatty Acids (FISH OIL) 1000 MG capsule capsule, Take  by mouth Daily With Breakfast., Disp: , Rfl:    also entered in Sleep  "Questionnaire    Patient  has a past medical history of Allergic (Mold), Allergic rhinitis, Arthritis (Various joints), Asthma, Depression (Managed w meds), Disease of thyroid gland, Hypothyroidism (On meds), Tic disorder, and Tremor (In jaw, on meds).    I have reviewed the Past Medical History, Past Surgical History, Social History and Family History.    Vital Signs /89   Pulse 60   Ht 152.4 cm (60\")   Wt 69.9 kg (154 lb)   SpO2 99%   BMI 30.08 kg/m²  Body mass index is 30.08 kg/m².    General Alert and oriented. No acute distress noted   Pharynx/Throat Class IV  Mallampati airway, large tongue, no evidence of redundant lateral pharyngeal tissue. No oral lesions. No thrush. Moist mucous membranes.   Head Normocephalic. Symmetrical. Atraumatic.    Nose No septal deviation. No drainage   Chest Wall Normal shape. Symmetric expansion with respiration. No tenderness.   Neck Trachea midline, no thyromegaly or adenopathy    Lungs Clear to auscultation bilaterally. No wheezes. No rhonchi. No rales. Respirations regular, even and unlabored.   Heart Regular rhythm and normal rate. Normal S1 and S2. No murmur   Abdomen Soft, non-tender and non-distended. Normal bowel sounds. No masses.   Extremities Moves all extremities well. No edema   Psychiatric Normal mood and affect.     Testing:  ·  Study-The MSLT study revealed a mean sleep latency of 2.8 minutes and 0 Sleep Onset REM (SOREM’s) were seen.        Impression:  1. Medication monitoring encounter    2. Idiopathic hypersomnia with long sleep time          Plan:  Viktoria is here to establish care with us for treatment of idiopathic hypersomnia. I reviewed prior records from Dr Bowden. UDS was ordered, controlled substance agreement with us was signed, PDMP reviewed. I have issued refills on the Modafinil.  Pt takes med as prescribed and denies SE.    Follow up with Dr. Bunch in 6 months    Thank you for allowing me to participate in your patient's care.      Chloe " ROSALIA Gaston  [facility]  Phone: [phone]      Part of this note may be an electronic transcription/translation of spoken language to printed text using the Dragon Dictation System.

## 2023-03-27 RX ORDER — GUANFACINE 2 MG/1
TABLET ORAL
Qty: 60 TABLET | OUTPATIENT
Start: 2023-03-27

## 2023-04-11 ENCOUNTER — OFFICE VISIT (OUTPATIENT)
Dept: SLEEP MEDICINE | Facility: HOSPITAL | Age: 53
End: 2023-04-11
Payer: COMMERCIAL

## 2023-04-11 VITALS
HEART RATE: 62 BPM | BODY MASS INDEX: 31.18 KG/M2 | SYSTOLIC BLOOD PRESSURE: 140 MMHG | HEIGHT: 60 IN | DIASTOLIC BLOOD PRESSURE: 83 MMHG | WEIGHT: 158.8 LBS | OXYGEN SATURATION: 98 %

## 2023-04-11 DIAGNOSIS — E66.9 OBESITY (BMI 30-39.9): Primary | ICD-10-CM

## 2023-04-11 DIAGNOSIS — G47.11 IDIOPATHIC HYPERSOMNIA WITH LONG SLEEP TIME: ICD-10-CM

## 2023-04-11 PROCEDURE — G0463 HOSPITAL OUTPT CLINIC VISIT: HCPCS

## 2023-04-11 RX ORDER — MODAFINIL 200 MG/1
200 TABLET ORAL DAILY
Qty: 30 TABLET | Refills: 2 | Status: SHIPPED | OUTPATIENT
Start: 2023-04-11

## 2023-04-11 NOTE — PROGRESS NOTES
[facility] SLEEP MEDICINE  [address]  [facility] [id number] [phone]    PCP: Audrey Chew MD    Reason for visit:  Sleep disorders: IH    Viktoria is a 52 y.o.female who was seen in the Sleep Disorders Center today. She had trouble with daytime sleepiness since high school. Tested in 2018 and since then on modafinil for IH. She sleeps from 9:45pm to 5:45am. Takes 1/2 tab at 6 am and other 1/2 tab at 12 noon. Gets palpitations if she takes full dose at 6am. It allows her to stay awake through the day. No further EDS. Denies insomnia. No palpitations if she splits the dose. Denies anxiety exacerbation by the meds. She has gained 10 lbs in 5 years.  [place] Sleepiness Scale is 6. Caffeine 4 per day. Alcohol 8 per week.    Viktoria  reports that she has never smoked. She has never used smokeless tobacco.    Pertinent Positive Review of Systems of cough  Rest of Review of Systems was negative as recorded in Sleep Questionnaire.    Patient  has a past medical history of Allergic (Mold), Allergic rhinitis, Arthritis (Various joints), Asthma, Depression (Managed w meds), Disease of thyroid gland, Hypothyroidism (On meds), Tic disorder, and Tremor (In jaw, on meds).     Current Medications:    Current Outpatient Medications:   •  modafinil (PROVIGIL) 200 MG tablet, Take 1 tablet by mouth Daily., Disp: 30 tablet, Rfl: 2  •  calcium citrate (CALCITRATE) 950 (200 Ca) MG tablet, Take 1 tablet by mouth Daily., Disp: , Rfl:   •  cetirizine (zyrTEC) 10 MG tablet, Take 10 mg by mouth Daily., Disp: , Rfl:   •  desvenlafaxine (PRISTIQ) 100 MG 24 hr tablet, , Disp: , Rfl:   •  Dulera 100-5 MCG/ACT inhaler, INHALE TWO PUFFS BY MOUTH TWICE A DAY AS NEEDED, Disp: 13 g, Rfl: 2  •  guanFACINE (TENEX) 2 MG tablet, , Disp: , Rfl:   •  levothyroxine (SYNTHROID, LEVOTHROID) 88 MCG tablet, Take 88 mcg by mouth Daily., Disp: , Rfl:   •  magnesium oxide (MAGOX) 400 (241.3 Mg) MG tablet tablet, Take 400 mg by  "mouth Daily., Disp: , Rfl:   •  montelukast (SINGULAIR) 10 MG tablet, Take 1 tablet by mouth Daily., Disp: 30 tablet, Rfl: 8  •  Multiple Vitamin (MULTI VITAMIN DAILY) tablet, Take  by mouth., Disp: , Rfl:   •  NON FORMULARY, Testosterone vaginal tablet, Disp: , Rfl:   •  Omega-3 Fatty Acids (FISH OIL) 1000 MG capsule capsule, Take  by mouth Daily With Breakfast., Disp: , Rfl:    also entered in Sleep Questionnaire         Vital Signs: /83   Pulse 62   Ht 152.4 cm (60\")   Wt 72 kg (158 lb 12.8 oz)   SpO2 98%   BMI 31.01 kg/m²     Body mass index is 31.01 kg/m².       Tongue: Large       Dentition: good       Pharynx: Posterior pharyngeal pillars are wide   Mallampatti: III (soft and hard palate and base of uvula visible)        General: Alert. Cooperative. Well developed. No acute distress.             Head:  Normocephalic. Symmetrical. Atraumatic.              Nose: No septal deviation. No drainage.          Throat: No oral lesions. No thrush. Moist mucous membranes.    Chest Wall:  Normal shape. Symmetric expansion with respiration. No tenderness.             Neck:  Trachea midline.           Lungs:  Clear to auscultation bilaterally. No wheezes. No rhonchi. No rales. Respirations regular, even and unlabored.            Heart:  Regular rhythm and normal rate. Normal S1 and S2. No murmur.     Abdomen:  Soft, non-tender and non-distended. Normal bowel sounds. No masses.  Extremities:  Moves all extremities well. No edema.    Psychiatric: Normal mood and affect.    Diagnostic data available to date is as below and was reviewed on current visit:  · 2/16/18: Apnea/hypopnea Index during REM Sleep was 1.2 per sleep hour and during NREM Sleep was 0 per sleep hour.  Apnea/Hypopnea index during 0.5 minutes of sleep in the supine position was 0 per sleep hour. The Apnea Hypopnea index during 393.5 minutes of sleep in the non-supine position was 0.2 events per sleep hour. The snore index was 0 per sleep hour and " snore arousal index was 0 per sleep hour.  · 2/16/18: The MSLT study revealed a mean sleep latency of 2.8 minutes and 0 Sleep Onset REM (SOREM’s) were seen.      No orders of the defined types were placed in this encounter.    New Medications Ordered This Visit   Medications   • modafinil (PROVIGIL) 200 MG tablet     Sig: Take 1 tablet by mouth Daily.     Dispense:  30 tablet     Refill:  2       Impression:  1. Obesity (BMI 30-39.9)    2. Idiopathic hypersomnia with long sleep time        Plan:   Viktoria has done well on modafinil 100 mg twice a day.  I offered to change her prescription to the 100 mg tablets twice daily but she prefers to use her 90 mg tablet and split it in half instead.  She most likely has narcolepsy though the MSLT was not reflective and possibly due to concomitant use of SSRI.  He has no adverse effects of the medicine.  She uses the modafinil according to controlled substance agreement form.  PDMP was reviewed.  New prescription provided.  She has had some weight gain and has some mild snoring.  However denies daytime sleepiness and prior sleep study was fairly unremarkable.  Will consider doing at least an HST in future to rule out any underlying obstructive sleep apnea that may have developed as a result of weight gain.    Patient will follow up in this clinic in 6 months  ROSALIA    Thank you for allowing me to participate in your patient's care.    Electronically signed by Ti Bunch MD, 04/11/23, 3:26 PM EDT.    Part of this note may be an electronic transcription/translation of spoken language to printed text using the Dragon Dictation System.

## 2023-05-12 ENCOUNTER — APPOINTMENT (OUTPATIENT)
Dept: WOMENS IMAGING | Facility: HOSPITAL | Age: 53
End: 2023-05-12
Payer: COMMERCIAL

## 2023-05-12 PROCEDURE — G0279 TOMOSYNTHESIS, MAMMO: HCPCS | Performed by: RADIOLOGY

## 2023-05-12 PROCEDURE — 77062 BREAST TOMOSYNTHESIS BI: CPT | Performed by: RADIOLOGY

## 2023-05-12 PROCEDURE — 77066 DX MAMMO INCL CAD BI: CPT | Performed by: RADIOLOGY

## 2023-08-21 DIAGNOSIS — G47.11 IDIOPATHIC HYPERSOMNIA WITH LONG SLEEP TIME: ICD-10-CM

## 2023-08-21 RX ORDER — MODAFINIL 200 MG/1
200 TABLET ORAL DAILY
Qty: 30 TABLET | Refills: 2 | Status: SHIPPED | OUTPATIENT
Start: 2023-08-21

## 2023-10-25 ENCOUNTER — OFFICE VISIT (OUTPATIENT)
Dept: SLEEP MEDICINE | Facility: HOSPITAL | Age: 53
End: 2023-10-25
Payer: COMMERCIAL

## 2023-10-25 VITALS
BODY MASS INDEX: 31.02 KG/M2 | DIASTOLIC BLOOD PRESSURE: 77 MMHG | OXYGEN SATURATION: 98 % | SYSTOLIC BLOOD PRESSURE: 140 MMHG | HEART RATE: 73 BPM | WEIGHT: 158 LBS | HEIGHT: 60 IN

## 2023-10-25 DIAGNOSIS — G47.11 IDIOPATHIC HYPERSOMNIA WITH LONG SLEEP TIME: Primary | ICD-10-CM

## 2023-10-25 DIAGNOSIS — Z51.81 MEDICATION MONITORING ENCOUNTER: ICD-10-CM

## 2023-10-25 PROCEDURE — G0463 HOSPITAL OUTPT CLINIC VISIT: HCPCS

## 2023-10-25 NOTE — PROGRESS NOTES
[facility] Sleep Disorders Center  Telephone: [phone] / Fax: [phone] [place]  Telephone: [phone] / Fax: [phone] Gaby Mirza    PCP: Anaya Burgos MD    Reason for visit: idiopathic hypersomnia f/u    Viktoria Mukherjee is a 52 y.o.female  was last seen at [facility] sleep lab in April 2023. She remains on Modafinil 100mg BID. She takes the first dose at 6am. She no longer falls asleep in meetings. She never takes the second dose later than 2pm. On some days she even skips the dose. She has IUD(Mirena). She reduced caffeine and no longer reports heart palpitations.  She gets enough sleep in general, 10pm-6am.  ESS is 8.    SH- no tobacco, 8 alc per week, 2 caffeine per day, no energy drinks    ROS- +cough, rest is negative.    Current Medications:    Current Outpatient Medications:     calcium citrate (CALCITRATE) 950 (200 Ca) MG tablet, Take 1 tablet by mouth Daily., Disp: , Rfl:     cetirizine (zyrTEC) 10 MG tablet, Take 10 mg by mouth Daily., Disp: , Rfl:     desvenlafaxine (PRISTIQ) 100 MG 24 hr tablet, , Disp: , Rfl:     Dulera 100-5 MCG/ACT inhaler, INHALE TWO PUFFS BY MOUTH TWICE A DAY AS NEEDED, Disp: 13 g, Rfl: 2    guanFACINE (TENEX) 2 MG tablet, , Disp: , Rfl:     levothyroxine (SYNTHROID, LEVOTHROID) 88 MCG tablet, Take 88 mcg by mouth Daily., Disp: , Rfl:     magnesium oxide (MAGOX) 400 (241.3 Mg) MG tablet tablet, Take 400 mg by mouth Daily., Disp: , Rfl:     modafinil (PROVIGIL) 200 MG tablet, Take 1 tablet by mouth Daily., Disp: 30 tablet, Rfl: 2    montelukast (SINGULAIR) 10 MG tablet, Take 1 tablet by mouth Daily., Disp: 30 tablet, Rfl: 8    Multiple Vitamin (MULTI VITAMIN DAILY) tablet, Take  by mouth., Disp: , Rfl:     NON FORMULARY, Testosterone vaginal tablet, Disp: , Rfl:     Omega-3 Fatty Acids (FISH OIL) 1000 MG capsule capsule, Take  by mouth Daily With Breakfast., Disp: , Rfl:    also entered in Sleep Questionnaire    Patient  has a past medical history of Allergic (Mold),  "Allergic rhinitis, Arthritis (Various joints), Asthma, Depression (Managed w meds), Disease of thyroid gland, Hypothyroidism (On meds), Tic disorder, and Tremor (In jaw, on meds).    I have reviewed the Past Medical History, Past Surgical History, Social History and Family History.    Vital Signs /77   Pulse 73   Ht 152.4 cm (60\")   Wt 71.7 kg (158 lb)   SpO2 98%   BMI 30.86 kg/m²  Body mass index is 30.86 kg/m².    General Alert and oriented. No acute distress noted   Pharynx/Throat Class IV Mallampati airway, large tongue, no evidence of redundant lateral pharyngeal tissue. No oral lesions. No thrush. Moist mucous membranes.   Head Normocephalic. Symmetrical. Atraumatic.    Nose No septal deviation. No drainage   Chest Wall Normal shape. Symmetric expansion with respiration. No tenderness.   Neck Trachea midline, no thyromegaly or adenopathy    Lungs Clear to auscultation bilaterally. No wheezes. No rhonchi. No rales. Respirations regular, even and unlabored.   Heart Regular rhythm and normal rate. Normal S1 and S2. No murmur   Abdomen Soft, non-tender and non-distended. Normal bowel sounds. No masses.   Extremities Moves all extremities well. No edema   Psychiatric Normal mood and affect.     Testing:        Diagnostic data available to date is as below and was reviewed on current visit:  2/16/18: Apnea/hypopnea Index during REM Sleep was 1.2 per sleep hour and during NREM Sleep was 0 per sleep hour.  Apnea/Hypopnea index during 0.5 minutes of sleep in the supine position was 0 per sleep hour. The Apnea Hypopnea index during 393.5 minutes of sleep in the non-supine position was 0.2 events per sleep hour. The snore index was 0 per sleep hour and snore arousal index was 0 per sleep hour.  2/16/18: The MSLT study revealed a mean sleep latency of 2.8 minutes and 0 Sleep Onset REM (SOREM’s) were seen.    Impression:  1. Idiopathic hypersomnia with long sleep time    2. Medication monitoring encounter  "         Plan:  Viktoria is doing well on the Modafinil 100mg BID. It controls daytime sleepiness and improves alertness. She is unaware of SE. I reviewed PDMP report. She takes the medication as per controlled substance agreement. We discussed the changes in sleep architecture after menopause as well as importance of exercise as sleep enhancer.    Follow up with Dr. Bunch in one year    Thank you for allowing me to participate in your patient's care.      ROSALIA Avila  [facility]  Phone: [phone]      Part of this note may be an electronic transcription/translation of spoken language to printed text using the Dragon Dictation System.

## 2023-12-20 ENCOUNTER — TELEPHONE (OUTPATIENT)
Dept: SLEEP MEDICINE | Facility: HOSPITAL | Age: 53
End: 2023-12-20
Payer: COMMERCIAL

## 2024-04-16 ENCOUNTER — OFFICE VISIT (OUTPATIENT)
Dept: SLEEP MEDICINE | Facility: HOSPITAL | Age: 54
End: 2024-04-16
Payer: COMMERCIAL

## 2024-04-16 VITALS — OXYGEN SATURATION: 98 % | WEIGHT: 167 LBS | BODY MASS INDEX: 32.79 KG/M2 | HEIGHT: 60 IN | HEART RATE: 73 BPM

## 2024-04-16 DIAGNOSIS — E66.9 OBESITY (BMI 30-39.9): Primary | ICD-10-CM

## 2024-04-16 DIAGNOSIS — G47.11 IDIOPATHIC HYPERSOMNIA WITH LONG SLEEP TIME: ICD-10-CM

## 2024-04-16 PROCEDURE — G0463 HOSPITAL OUTPT CLINIC VISIT: HCPCS

## 2024-04-16 RX ORDER — MODAFINIL 200 MG/1
200 TABLET ORAL DAILY
Qty: 90 TABLET | Refills: 0 | Status: SHIPPED | OUTPATIENT
Start: 2024-04-16 | End: 2024-07-15

## 2024-04-16 NOTE — PROGRESS NOTES
[facility] SLEEP MEDICINE  [address]  [facility] [address] [phone]    PCP: Rivka Lynn APRN    Reason for visit:  Sleep disorders: IH    Viktoria is a 53 y.o.female who was seen in the Sleep Disorders Center today. She sleeps from 9:30pm to 6am. Takes 100 mg Modafinil at 6am and 2nd dose (usually) at 12noon. It allows to stay awake and functional most of the day. No AE if she splits the dose. No trouble falling asleep at night.  [place] Sleepiness Scale is 9. Caffeine 2 per day. Alcohol 10-12 per week.    Viktoria  reports that she has never smoked. She has never used smokeless tobacco.    Pertinent Positive Review of Systems of nasal congestion  Rest of Review of Systems was negative as recorded in Sleep Questionnaire.    Patient  has a past medical history of Allergic (Mold), Allergic rhinitis, Arthritis (Various joints), Asthma, Depression (Managed w meds), Disease of thyroid gland, Hypothyroidism (On meds), Tic disorder, and Tremor (In jaw, on meds).     Current Medications:    Current Outpatient Medications:     modafinil (PROVIGIL) 200 MG tablet, Take 1 tablet by mouth Daily for 90 days., Disp: 90 tablet, Rfl: 0    calcium citrate (CALCITRATE) 950 (200 Ca) MG tablet, Take 1 tablet by mouth Daily., Disp: , Rfl:     cetirizine (zyrTEC) 10 MG tablet, Take 10 mg by mouth Daily., Disp: , Rfl:     desvenlafaxine (PRISTIQ) 100 MG 24 hr tablet, , Disp: , Rfl:     Dulera 100-5 MCG/ACT inhaler, INHALE TWO PUFFS BY MOUTH TWICE A DAY AS NEEDED, Disp: 13 g, Rfl: 2    guanFACINE (TENEX) 2 MG tablet, , Disp: , Rfl:     levothyroxine (SYNTHROID, LEVOTHROID) 88 MCG tablet, Take 88 mcg by mouth Daily., Disp: , Rfl:     magnesium oxide (MAGOX) 400 (241.3 Mg) MG tablet tablet, Take 400 mg by mouth Daily., Disp: , Rfl:     montelukast (SINGULAIR) 10 MG tablet, Take 1 tablet by mouth Daily., Disp: 30 tablet, Rfl: 8    Multiple Vitamin (MULTI VITAMIN DAILY) tablet, Take  by mouth., Disp: , Rfl:  "    NON FORMULARY, Testosterone vaginal tablet, Disp: , Rfl:     Omega-3 Fatty Acids (FISH OIL) 1000 MG capsule capsule, Take  by mouth Daily With Breakfast., Disp: , Rfl:    also entered in Sleep Questionnaire         Vital Signs: Pulse 73   Ht 152.4 cm (60\")   Wt 75.8 kg (167 lb)   SpO2 98%   BMI 32.61 kg/m²     Body mass index is 32.61 kg/m².       Tongue: Large       Dentition: good       Pharynx: Posterior pharyngeal pillars are narrow   Mallampatti: III (soft and hard palate and base of uvula visible)        General: Alert. Cooperative. Well developed. No acute distress.             Head:  Normocephalic. Symmetrical. Atraumatic.              Nose: No septal deviation. No drainage.          Throat: No oral lesions. No thrush. Moist mucous membranes.    Chest Wall:  Normal shape. Symmetric expansion with respiration. No tenderness.             Neck:  Trachea midline.           Lungs:  Clear to auscultation bilaterally. No wheezes. No rhonchi. No rales. Respirations regular, even and unlabored.            Heart:  Regular rhythm and normal rate. Normal S1 and S2. No murmur.     Abdomen:  Soft, non-tender and non-distended. Normal bowel sounds. No masses.  Extremities:  Moves all extremities well. No edema.    Psychiatric: Normal mood and affect.    Diagnostic data available to date is as below and was reviewed on current visit:  2/16/18: Apnea/hypopnea Index during REM Sleep was 1.2 per sleep hour and during NREM Sleep was 0 per sleep hour.  Apnea/Hypopnea index during 0.5 minutes of sleep in the supine position was 0 per sleep hour. The Apnea Hypopnea index during 393.5 minutes of sleep in the non-supine position was 0.2 events per sleep hour. The snore index was 0 per sleep hour and snore arousal index was 0 per sleep hour.  2/16/18: The MSLT study revealed a mean sleep latency of 2.8 minutes and 0 Sleep Onset REM (SOREM’s) were seen.      No orders of the defined types were placed in this encounter.    New " Medications Ordered This Visit   Medications    modafinil (PROVIGIL) 200 MG tablet     Sig: Take 1 tablet by mouth Daily for 90 days.     Dispense:  90 tablet     Refill:  0       Impression:  1. Obesity (BMI 30-39.9)    2. Idiopathic hypersomnia with long sleep time        Plan:  Viktoria will continue Modafinil 100 mg bid.  She finds it effective and without any side effects.  She is using medication according to controlled substance agreement form.  PDMP was reviewed today again.    Patient will follow up in this clinic in 6 months  APRN.    Thank you for allowing me to participate in your patient's care.    Electronically signed by Ti Bunch MD, 04/16/24, 3:59 PM EDT.    Part of this note may be an electronic transcription/translation of spoken language to printed text using the Dragon Dictation System.

## 2024-08-16 DIAGNOSIS — G47.11 IDIOPATHIC HYPERSOMNIA WITH LONG SLEEP TIME: ICD-10-CM

## 2024-08-16 RX ORDER — MODAFINIL 200 MG/1
200 TABLET ORAL DAILY
Qty: 90 TABLET | Refills: 0 | Status: SHIPPED | OUTPATIENT
Start: 2024-08-16 | End: 2024-11-14

## 2024-10-18 ENCOUNTER — OFFICE VISIT (OUTPATIENT)
Dept: SLEEP MEDICINE | Facility: HOSPITAL | Age: 54
End: 2024-10-18
Payer: COMMERCIAL

## 2024-10-18 VITALS — WEIGHT: 163.6 LBS | BODY MASS INDEX: 32.12 KG/M2 | HEIGHT: 60 IN | OXYGEN SATURATION: 97 % | HEART RATE: 66 BPM

## 2024-10-18 DIAGNOSIS — Z51.81 MEDICATION MONITORING ENCOUNTER: ICD-10-CM

## 2024-10-18 DIAGNOSIS — G47.11 IDIOPATHIC HYPERSOMNIA WITH LONG SLEEP TIME: Primary | ICD-10-CM

## 2024-10-18 DIAGNOSIS — E66.9 OBESITY (BMI 30-39.9): ICD-10-CM

## 2024-10-18 PROCEDURE — G0463 HOSPITAL OUTPT CLINIC VISIT: HCPCS

## 2024-10-18 NOTE — PROGRESS NOTES
[facility] Sleep Disorders Center  Telephone: [phone] / Fax: [phone] [place]  Telephone: [phone] / Fax: [phone] Gaby Mriza    PCP: Rivka Lynn APRN    Reason for visit: hypersomnia f/u    Viktoria Mukherjee is a 53 y.o.female  was last seen at [facility] sleep lab in April 2024. She is on Modafinil 100mg BID. She takes the afternoon Modafinil if she has to drive in the afternoon. She gets 200mg tablets, splits them in half. She filled the rx 8/17/24 for 90 supply. PDMP was reviewed. Pt takes the medication as prescribed.    Interval history reviewed. She has been noticing recent issues with numbness of the hands, did PT/OT. She has possible cervical spine stenosis. No SE on the Modafinil currently reported. BP HR ok. No anxiety, irritability.  She has Mirena IUD.    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    ROS-negative.    Current Medications:    Current Outpatient Medications:     calcium citrate (CALCITRATE) 950 (200 Ca) MG tablet, Take 1 tablet by mouth Daily., Disp: , Rfl:     cetirizine (zyrTEC) 10 MG tablet, Take 10 mg by mouth Daily., Disp: , Rfl:     desvenlafaxine (PRISTIQ) 100 MG 24 hr tablet, , Disp: , Rfl:     Dulera 100-5 MCG/ACT inhaler, INHALE TWO PUFFS BY MOUTH TWICE A DAY AS NEEDED, Disp: 13 g, Rfl: 2    guanFACINE (TENEX) 2 MG tablet, , Disp: , Rfl:     levothyroxine (SYNTHROID, LEVOTHROID) 88 MCG tablet, Take 88 mcg by mouth Daily., Disp: , Rfl:     magnesium oxide (MAGOX) 400 (241.3 Mg) MG tablet tablet, Take 400 mg by mouth Daily., Disp: , Rfl:     modafinil (PROVIGIL) 200 MG tablet, Take 1 tablet by mouth Daily for 90 days., Disp: 90 tablet, Rfl: 0    montelukast (SINGULAIR) 10 MG tablet, Take 1 tablet by mouth Daily., Disp: 30 tablet, Rfl: 8    Multiple Vitamin (MULTI VITAMIN DAILY) tablet, Take  by mouth., Disp: , Rfl:     NON FORMULARY, Testosterone vaginal tablet, Disp: , Rfl:     Omega-3 Fatty Acids (FISH OIL) 1000 MG capsule capsule, Take  by mouth  "Daily With Breakfast., Disp: , Rfl:    also entered in Sleep Questionnaire    Patient  has a past medical history of Allergic (Mold), Allergic rhinitis, Arthritis (Various joints), Asthma, Depression (Managed w meds), Disease of thyroid gland, Hypothyroidism (On meds), Tic disorder, and Tremor (In jaw, on meds).    I have reviewed the Past Medical History, Past Surgical History, Social History and Family History.    Vital Signs Pulse 66   Ht 152.4 cm (60\")   Wt 74.2 kg (163 lb 9.6 oz)   SpO2 97%   BMI 31.95 kg/m²  Body mass index is 31.95 kg/m².    General Alert and oriented. No acute distress noted   Pharynx/Throat Class  III  Mallampati airway, large tongue, no evidence of redundant lateral pharyngeal tissue. No oral lesions. No thrush. Moist mucous membranes.   Head Normocephalic. Symmetrical. Atraumatic.    Nose No septal deviation. No drainage   Chest Wall Normal shape. Symmetric expansion with respiration. No tenderness.   Neck Trachea midline, no thyromegaly or adenopathy    Lungs Clear to auscultation bilaterally. No wheezes. No rhonchi. No rales. Respirations regular, even and unlabored.   Heart Regular rhythm and normal rate. Normal S1 and S2. No murmur   Abdomen Soft, non-tender and non-distended. Normal bowel sounds. No masses.   Extremities Moves all extremities well. No edema   Psychiatric Normal mood and affect.     Testing:  Study-Diagnostic data available to date is as below and was reviewed on current visit:  2/16/18: Apnea/hypopnea Index during REM Sleep was 1.2 per sleep hour and during NREM Sleep was 0 per sleep hour.  Apnea/Hypopnea index during 0.5 minutes of sleep in the supine position was 0 per sleep hour. The Apnea Hypopnea index during 393.5 minutes of sleep in the non-supine position was 0.2 events per sleep hour. The snore index was 0 per sleep hour and snore arousal index was 0 per sleep hour.  2/16/18: The MSLT study revealed a mean sleep latency of 2.8 minutes and 0 Sleep Onset " REM (SOREM’s) were seen.    Impression:  1. Idiopathic hypersomnia with long sleep time    2. Medication monitoring encounter    3. Obesity (BMI 30-39.9)          Plan:  Viktoria is doing well on Modafinil 100mg BID. No SE reported. She finds therapy to be very effective. She will let us know if she experiences any problems on the medication. She will f/u with us in 6 months. We discussed the interaction between hormonal contraceptives and Modafinil. Cautioned.    Thank you for allowing me to participate in your patient's care.      ROSALIA Avila  [facility]  Phone: [phone]      Part of this note may be an electronic transcription/translation of spoken language to printed text using the Dragon Dictation System.     no

## 2024-12-24 DIAGNOSIS — G47.11 IDIOPATHIC HYPERSOMNIA WITH LONG SLEEP TIME: ICD-10-CM

## 2024-12-24 RX ORDER — MODAFINIL 200 MG/1
200 TABLET ORAL DAILY
Qty: 30 TABLET | Refills: 0 | Status: SHIPPED | OUTPATIENT
Start: 2024-12-24 | End: 2025-03-24

## 2025-03-19 DIAGNOSIS — G47.11 IDIOPATHIC HYPERSOMNIA WITH LONG SLEEP TIME: ICD-10-CM

## 2025-03-19 RX ORDER — MODAFINIL 200 MG/1
200 TABLET ORAL DAILY
Qty: 30 TABLET | Refills: 0 | Status: SHIPPED | OUTPATIENT
Start: 2025-03-19 | End: 2025-06-17

## 2025-04-29 ENCOUNTER — OFFICE VISIT (OUTPATIENT)
Dept: SLEEP MEDICINE | Facility: HOSPITAL | Age: 55
End: 2025-04-29
Payer: COMMERCIAL

## 2025-04-29 VITALS
BODY MASS INDEX: 30.82 KG/M2 | HEART RATE: 73 BPM | HEIGHT: 60 IN | DIASTOLIC BLOOD PRESSURE: 87 MMHG | WEIGHT: 157 LBS | OXYGEN SATURATION: 97 % | SYSTOLIC BLOOD PRESSURE: 121 MMHG

## 2025-04-29 DIAGNOSIS — G47.11 IDIOPATHIC HYPERSOMNIA WITH LONG SLEEP TIME: ICD-10-CM

## 2025-04-29 DIAGNOSIS — G47.11 IDIOPATHIC HYPERSOMNIA: Primary | ICD-10-CM

## 2025-04-29 DIAGNOSIS — E66.9 OBESITY (BMI 30-39.9): ICD-10-CM

## 2025-04-29 PROCEDURE — G0463 HOSPITAL OUTPT CLINIC VISIT: HCPCS

## 2025-04-29 RX ORDER — MODAFINIL 100 MG/1
200 TABLET ORAL DAILY
Qty: 135 TABLET | Refills: 0 | Status: SHIPPED | OUTPATIENT
Start: 2025-04-29 | End: 2025-07-28

## 2025-04-29 NOTE — PROGRESS NOTES
[facility] SLEEP MEDICINE  [address]  [facility] [address] [phone]    PCP: Rivka Lynn APRN    Reason for visit:  Sleep disorders: IH    Viktoria is a 54 y.o.female who was [facility] in the [facility] today. Remains on Modafinil 100 mg bid. She sleeps from 11pm to 6am. Takes AM dose and is sleepy by 2-3pm. If she takes her afternoon dose around 12noon she does not had EDS. Has mild palpitations in morning - not taking with any food.  [place] Sleepiness Scale is 10. Caffeine 4 per day. Alcohol 7 per week.    Viktoria  reports that she has never smoked. She has never used smokeless tobacco.    Pertinent Positive Review of Systems of denies  Rest of Review of Systems was negative as recorded in Sleep Questionnaire.    Patient  has a past medical history of Allergic (Mold), Allergic rhinitis, Arthritis (Various joints), Asthma, Depression (Managed w meds), Disease of thyroid gland, Hypothyroidism (On meds), Tic disorder, and Tremor (In jaw, on meds).     Current Medications:    Current Outpatient Medications:     modafinil (PROVIGIL) 100 MG tablet, Take 2 tablets by mouth Daily for 90 days., Disp: 135 tablet, Rfl: 0    calcium citrate (CALCITRATE) 950 (200 Ca) MG tablet, Take 1 tablet by mouth Daily., Disp: , Rfl:     cetirizine (zyrTEC) 10 MG tablet, Take 10 mg by mouth Daily., Disp: , Rfl:     desvenlafaxine (PRISTIQ) 100 MG 24 hr tablet, , Disp: , Rfl:     Dulera 100-5 MCG/ACT inhaler, INHALE TWO PUFFS BY MOUTH TWICE A DAY AS NEEDED, Disp: 13 g, Rfl: 2    guanFACINE (TENEX) 2 MG tablet, , Disp: , Rfl:     levothyroxine (SYNTHROID, LEVOTHROID) 88 MCG tablet, Take 88 mcg by mouth Daily., Disp: , Rfl:     magnesium oxide (MAGOX) 400 (241.3 Mg) MG tablet tablet, Take 400 mg by mouth Daily., Disp: , Rfl:     montelukast (SINGULAIR) 10 MG tablet, Take 1 tablet by mouth Daily., Disp: 30 tablet, Rfl: 8    Multiple Vitamin (MULTI VITAMIN DAILY) tablet, Take  by mouth., Disp: , Rfl:  "    NON FORMULARY, Testosterone vaginal tablet, Disp: , Rfl:     Omega-3 Fatty Acids (FISH OIL) 1000 MG capsule capsule, Take  by mouth Daily With Breakfast., Disp: , Rfl:    also entered in Sleep Questionnaire         Vital Signs: /87   Pulse 73   Ht 152.4 cm (60\")   Wt 71.2 kg (157 lb)   SpO2 97%   BMI 30.66 kg/m²     Body mass index is 30.66 kg/m².       Tongue: Large        Dentition: good       Pharynx: Posterior pharyngeal pillars are narrow   Mallampatti: III (soft and hard palate and base of uvula visible)        General: Alert. Cooperative. Well developed. No acute distress.             Nose: No septal deviation. No drainage.          Throat: No oral lesions. No thrush. Moist mucous membranes.           Lungs:  Clear to auscultation bilaterally.            Heart:  Regular rhythm and normal rate. No murmur.     Diagnostic data available to date is as below and was reviewed on current visit:  2/16/18: Apnea/hypopnea Index during REM Sleep was 1.2 per sleep hour and during NREM Sleep was 0 per sleep hour.  Apnea/Hypopnea index during 0.5 minutes of sleep in the supine position was 0 per sleep hour. The Apnea Hypopnea index during 393.5 minutes of sleep in the non-supine position was 0.2 events per sleep hour. The snore index was 0 per sleep hour and snore arousal index was 0 per sleep hour.  2/16/18: The MSLT study revealed a mean sleep latency of 2.8 minutes and 0 Sleep Onset REM (SOREM’s) were seen.      No orders of the defined types were placed in this encounter.    New Medications Ordered This Visit   Medications    modafinil (PROVIGIL) 100 MG tablet     Sig: Take 2 tablets by mouth Daily for 90 days.     Dispense:  135 tablet     Refill:  0       Impression:  1. Idiopathic hypersomnia with long sleep time    2. Obesity (BMI 30-39.9)        Plan:  Viktoria prefers to use modafinil 100 mg twice daily.  On some days she misses the second dose.  She finds the modafinil effective and without any " adverse side effects.  She would like 135 tablets for 90 days and I have therefore refilled at this prescription amount.  PDMP was reviewed.  Patient is continuing to use the medication according to controlled substance agreement form.    Patient will follow up in this clinic in 6 months  ROSALIA    Thank you for allowing me to participate in your patient's care.    Electronically signed by Ti Bunch MD, 04/29/25, 3:49 PM EDT.    Part of this note may be an electronic transcription/translation of spoken language to printed text using the Dragon Dictation System.

## 2025-04-30 ENCOUNTER — TELEPHONE (OUTPATIENT)
Dept: SLEEP MEDICINE | Facility: HOSPITAL | Age: 55
End: 2025-04-30
Payer: COMMERCIAL